# Patient Record
Sex: MALE | Race: OTHER | HISPANIC OR LATINO | Employment: FULL TIME | URBAN - METROPOLITAN AREA
[De-identification: names, ages, dates, MRNs, and addresses within clinical notes are randomized per-mention and may not be internally consistent; named-entity substitution may affect disease eponyms.]

---

## 2023-04-24 ENCOUNTER — OFFICE VISIT (OUTPATIENT)
Dept: OBGYN CLINIC | Facility: OTHER | Age: 48
End: 2023-04-24

## 2023-04-24 VITALS
DIASTOLIC BLOOD PRESSURE: 96 MMHG | HEIGHT: 70 IN | HEART RATE: 99 BPM | SYSTOLIC BLOOD PRESSURE: 149 MMHG | BODY MASS INDEX: 27.23 KG/M2 | WEIGHT: 190.2 LBS

## 2023-04-24 DIAGNOSIS — M54.16 RADICULOPATHY, LUMBAR REGION: Primary | ICD-10-CM

## 2023-04-24 DIAGNOSIS — M54.42 ACUTE BILATERAL LOW BACK PAIN WITH BILATERAL SCIATICA: ICD-10-CM

## 2023-04-24 DIAGNOSIS — M54.41 ACUTE BILATERAL LOW BACK PAIN WITH BILATERAL SCIATICA: ICD-10-CM

## 2023-04-24 RX ORDER — IBUPROFEN 600 MG/1
TABLET ORAL
COMMUNITY
Start: 2023-03-14

## 2023-04-24 RX ORDER — NAPROXEN 500 MG/1
TABLET ORAL
COMMUNITY
Start: 2023-03-23

## 2023-04-24 RX ORDER — METHOCARBAMOL 750 MG/1
TABLET, FILM COATED ORAL
COMMUNITY
Start: 2023-03-20

## 2023-04-24 RX ORDER — PANTOPRAZOLE SODIUM 40 MG/1
40 TABLET, DELAYED RELEASE ORAL DAILY
COMMUNITY
Start: 2023-04-07

## 2023-04-24 RX ORDER — FEXOFENADINE HCL 180 MG/1
180 TABLET ORAL DAILY
COMMUNITY
Start: 2023-03-20

## 2023-04-24 RX ORDER — OMEGA-3-ACID ETHYL ESTERS 1 G/1
CAPSULE, LIQUID FILLED ORAL
COMMUNITY
Start: 2023-04-07

## 2023-04-24 RX ORDER — ACETAMINOPHEN 500 MG
TABLET ORAL
COMMUNITY
Start: 2023-03-14

## 2023-04-24 RX ORDER — ALBUTEROL SULFATE 90 UG/1
2 AEROSOL, METERED RESPIRATORY (INHALATION) EVERY 4 HOURS PRN
COMMUNITY
Start: 2023-04-07

## 2023-04-24 RX ORDER — CETIRIZINE HYDROCHLORIDE 10 MG/1
10 TABLET ORAL DAILY
COMMUNITY
Start: 2023-04-07

## 2023-04-24 RX ORDER — LIDOCAINE 50 MG/G
PATCH TOPICAL
COMMUNITY
Start: 2023-03-14

## 2023-04-24 RX ORDER — CYCLOBENZAPRINE HCL 10 MG
10 TABLET ORAL 2 TIMES DAILY PRN
COMMUNITY
Start: 2023-04-07

## 2023-04-24 NOTE — LETTER
April 24, 2023     Patient: Kaylie Caal  YOB: 1975  Date of Visit: 4/24/2023      To Whom it May Concern:    Kaylie Caal is under my professional care  Bart Ruggiero was seen in my office on 4/24/2023  Bart Ruggiero may return to work with limitations   No repetitive bending or heavy lifting, pulling or pushing over 15 pounds  Follow-up after 4 weeks or sooner if any worse       If you have any questions or concerns, please don't hesitate to call           Sincerely,          Tracy Orourke MD        CC: No Recipients

## 2023-04-24 NOTE — PROGRESS NOTES
"Chief Complaint: Low back pain     HPI:    Marleny Muro is a 50y o  year old Male who presents today for new evaluation and treatment of low back pain       Description of symptoms: Per the patient, he reports having a back injury at work on 3/14/2023 and subsequently had another recurrence of pain at work on 3/17/2023  Patient reports that while lifting a pallet weighing 70 to 80 pounds on 3/14/2023 he started feeling acute low back pain  Pain was noticed in the entire lower back with radiation bilaterally to the posterior thigh up to the level of bilateral knees  He subsequently had worsening symptoms and was evaluated externally at [facility] on 3/14/2023  He was advised to take oral acetaminophen, NSAIDs, muscle relaxant and lidocaine patch  Patient has not done any physical therapy yet  Upon a subsequent visit to the emergency room on 3/17/2023 physical therapy and pain management referral was suggested  Patient subsequently followed up with his primary care physician  Patient denies having any significant relief of symptoms with pain medications so far and reports increased drowsiness with oral muscle relaxants  Patient denies having any previous back problems or surgery in the past prior to the reported work-related injury on 3/14/2023  Patient currently denies any new onset tingling, numbness or weakness of his lower extremities he does not have any known history of new onset bowel or bladder control problems  Denies any systemic symptoms like fever or chills  Plain radiograph of the lumbar spine was performed externally in the radiology report was reviewed as noted below  Images were currently unavailable for review  X-ray lumbar spine performed on 3/21/2023:  \"Findings: There is minimal levoscoliotic curvature of lumbar spine  The lumbar   lordosis is preserved  Vertebral body heights are maintained  There is minimal   retrolisthesis of L4 over L5   There is mild disc " "space narrowing at L4-L5  Mild   endplate spurring is noted at multiple levels  There is facet arthrosis at L4-L5   and L5-S1  Suggestion of pars interarticularis defects at L5  The visualized   ribs and sacroiliac joints are unremarkable  IMPRESSION:   Impression:   1  Degenerative changes at L4-L5  2  Cannot exclude pars interarticularis defects at L5\"    There is no problem list on file for this patient  Current Outpatient Medications on File Prior to Visit   Medication Sig Dispense Refill   • acetaminophen (TYLENOL) 500 mg tablet TAKE 1 TABLET (500 MG TOTAL) BY MOUTH EVERY 6 (SIX) HOURS AS NEEDED FOR MILD PAIN (PAIN SCORE 1-3)  • albuterol (PROVENTIL HFA,VENTOLIN HFA) 90 mcg/act inhaler Inhale 2 puffs every 4 (four) hours as needed     • albuterol (PROVENTIL HFA,VENTOLIN HFA) 90 mcg/act inhaler Inhale 2 puffs every 4 (four) hours as needed     • cetirizine (ZyrTEC) 10 mg tablet Take 10 mg by mouth daily     • fexofenadine (ALLEGRA) 180 MG tablet Take 180 mg by mouth daily     • ibuprofen (MOTRIN) 600 mg tablet TAKE 1 TABLET (600 MG TOTAL) BY MOUTH EVERY 6 (SIX) HOURS AS NEEDED FOR MILD PAIN (PAIN SCORE 1-3)  • lidocaine (LIDODERM) 5 % PLACE 1 PATCH ON THE SKIN DAILY FOR 7 DAYS  REMOVE & DISCARD PATCH WITHIN 12 HOURS OR AS DIRECTED BY MD     • omega-3-acid ethyl esters (LOVAZA) 1 g capsule TAKE 2 CAPSULES (2 G TOTAL) BY MOUTH 2 (TWO) TIMES A DAY  • pantoprazole (PROTONIX) 40 mg tablet Take 40 mg by mouth daily     • cyclobenzaprine (FLEXERIL) 10 mg tablet Take 10 mg by mouth 2 (two) times a day as needed (Patient not taking: Reported on 4/24/2023)     • methocarbamol (ROBAXIN) 750 mg tablet TAKE 1 TABLET (750 MG TOTAL) BY MOUTH 3 (THREE) TIMES A DAY AS NEEDED FOR MUSCLE SPASMS  (Patient not taking: Reported on 4/24/2023)     • naproxen (NAPROSYN) 500 mg tablet TAKE 1 TABLET (500 MG TOTAL) BY MOUTH 2 (TWO) TIMES A DAY WITH MEALS  TAKE WITH MEALS   (Patient not taking: Reported on 4/24/2023)  " No current facility-administered medications on file prior to visit  Not on File     Tobacco Use: Not on file        Social Determinants of Health     Tobacco Use: Not on file   Alcohol Use: Not on file   Financial Resource Strain: Not on file   Food Insecurity: Not on file   Transportation Needs: Not on file   Physical Activity: Not on file   Stress: Not on file   Social Connections: Not on file   Intimate Partner Violence: Not on file   Depression: Not on file   Housing Stability: Not on file               Review of Systems     Body mass index is 27 29 kg/m²  Physical Exam  Vitals and nursing note reviewed  HENT:      Head: Atraumatic  Cardiovascular:      Rate and Rhythm: Normal rate  Pulses: Normal pulses  Pulmonary:      Effort: Pulmonary effort is normal  No respiratory distress  Neurological:      Mental Status: He is alert and oriented to person, place, and time  Psychiatric:         Mood and Affect: Mood normal          Behavior: Behavior normal           Ortho Exam:    Body part: lumbar spine    Inspection: No clinically visible deformity    Palpation: Tender to palpation at the L4-L5 and the L5-S1 level    Range of motion: Limited lumbar spine flexion and extension  Special Tests: SLR subjectively positive bilaterally at 70 degrees  VERONICA induces low back discomfort bilaterally  Distal Neurovascular Status: Normal sensation to light touch in bilateral lower extremity all dermatomes and grade 5/5 strength of bilateral lower extremity all myotomes  2+ bilateral knee and ankle deep tendon reflexes and bilaterally downgoing Babinski's  Procedures       Assessment:     Diagnosis ICD-10-CM Associated Orders   1  Radiculopathy, lumbar region  M54 16 Nerve Stimulator (TENS Therapy Pain Relief) Alem Brown     Ambulatory Referral to Physical Therapy      2   Acute bilateral low back pain with bilateral sciatica  M54 42 Ambulatory Referral to Physical Therapy    M54 41 "    Plan:    Explained my current clinical findings and reviewed the radiology report with the patient  Symptoms are suggestive of bilateral lumbar radiculopathy with the radiology report also indicating L4-L5 degenerative disc disease and potential pars defect  He currently does not have any progressive neurological deficits on evaluation and hence I recommend a trial of physical therapy rehabilitation for which a referral is being made  Work modification note was also provided to the patient  Recommend him to use TENS therapy/e-stim for low back pain at this time and will follow-up in 4 weeks time for reassessment  If symptoms significantly persist, will consider an MRI of the lumbar spine for further evaluation  Follow-Up:    No follow-ups on file  Portions of the record may have been created with voice recognition software  Occasional wrong word or \"sound alike\" substitutions may have occurred due to the inherent limitations of voice recognition software  Please review the chart carefully and recognize, using context, where substitutions/typographical errors may have occurred           "

## 2023-04-25 ENCOUNTER — TELEPHONE (OUTPATIENT)
Dept: OBGYN CLINIC | Facility: HOSPITAL | Age: 48
End: 2023-04-25

## 2023-04-25 NOTE — TELEPHONE ENCOUNTER
Caller: Milla Irene @ [facility]    Doctor: Dolores Fleischer    Reason for call: Can we please fax Rx for PT? Thank you!     Call back#: [phone]    Fax#:  [phone]

## 2023-04-25 NOTE — TELEPHONE ENCOUNTER
Faxed PT script twice  Both attempts failed  Our office fax is currently down  Will try again once fax issue is resolved

## 2023-04-25 NOTE — TELEPHONE ENCOUNTER
Caller: Ariela Escalante with [facility]    Doctor: Higinio Burciaga    Reason for call: Please fax PT script to [phone] with patient's name in subject line       Call back#: n/a

## 2023-05-20 NOTE — PROGRESS NOTES
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)    Description of symptoms:     Last visit on 04/24/2023: Patient was evaluated for low back pain after an injury lifting pallets at work  Initially was seen by [facility] on 03/14/2023 with tx plan consisted of oral tylenol, oral NSAIDs, muscle relaxants  He was seen again on 03/17/2023 in [facility] ED for low back pain and was given formal physical therapy script and pain management referral  Elisa Kyaw were reviewed and additional management consisted of TENS therapy and work modifications  If no improvement in pain at next visit would consider additional imaging  Today's Visit: Patient continues to experience pain in his lower back despite several conservative management options  There are some radiation of the pain to the gluteal area primarily on the left side  Denies any new weakness  Denies any new bowel or bladder control problems  Denies new injury  Summary of treatment to-date:   Oral tylenol  Oral NSAIDs  Muscle relaxants  Formal physical therapy  Referral pain management  TENS therapy    I have personally reviewed pertinent films in PACS  X-ray lumbar spine performed on 3/21/2023:  \"Findings: There is minimal levoscoliotic curvature of lumbar spine  The lumbar   lordosis is preserved  Vertebral body heights are maintained  There is minimal   retrolisthesis of L4 over L5  There is mild disc space narrowing at L4-L5  Mild   endplate spurring is noted at multiple levels  There is facet arthrosis at L4-L5   and L5-S1  Suggestion of pars interarticularis defects at L5  The visualized   ribs and sacroiliac joints are unremarkable  IMPRESSION:   Impression:   1  Degenerative changes at L4-L5     2  Cannot exclude pars interarticularis defects at " "L5\"    There is no problem list on file for this patient  Current Outpatient Medications on File Prior to Visit   Medication Sig Dispense Refill   • acetaminophen (TYLENOL) 500 mg tablet TAKE 1 TABLET (500 MG TOTAL) BY MOUTH EVERY 6 (SIX) HOURS AS NEEDED FOR MILD PAIN (PAIN SCORE 1-3)  • albuterol (PROVENTIL HFA,VENTOLIN HFA) 90 mcg/act inhaler Inhale 2 puffs every 4 (four) hours as needed     • albuterol (PROVENTIL HFA,VENTOLIN HFA) 90 mcg/act inhaler Inhale 2 puffs every 4 (four) hours as needed     • cetirizine (ZyrTEC) 10 mg tablet Take 10 mg by mouth daily     • cyclobenzaprine (FLEXERIL) 10 mg tablet Take 10 mg by mouth 2 (two) times a day as needed (Patient not taking: Reported on 4/24/2023)     • fexofenadine (ALLEGRA) 180 MG tablet Take 180 mg by mouth daily     • ibuprofen (MOTRIN) 600 mg tablet TAKE 1 TABLET (600 MG TOTAL) BY MOUTH EVERY 6 (SIX) HOURS AS NEEDED FOR MILD PAIN (PAIN SCORE 1-3)  • lidocaine (LIDODERM) 5 % PLACE 1 PATCH ON THE SKIN DAILY FOR 7 DAYS  REMOVE & DISCARD PATCH WITHIN 12 HOURS OR AS DIRECTED BY MD     • methocarbamol (ROBAXIN) 750 mg tablet TAKE 1 TABLET (750 MG TOTAL) BY MOUTH 3 (THREE) TIMES A DAY AS NEEDED FOR MUSCLE SPASMS  (Patient not taking: Reported on 4/24/2023)     • naproxen (NAPROSYN) 500 mg tablet TAKE 1 TABLET (500 MG TOTAL) BY MOUTH 2 (TWO) TIMES A DAY WITH MEALS  TAKE WITH MEALS  (Patient not taking: Reported on 4/24/2023)     • Nerve Stimulator (TENS Therapy Pain Relief) REID Use 1 application  2 (two) times a day as needed (for lumbar radiculopathy) 1 each 0   • omega-3-acid ethyl esters (LOVAZA) 1 g capsule TAKE 2 CAPSULES (2 G TOTAL) BY MOUTH 2 (TWO) TIMES A DAY  • pantoprazole (PROTONIX) 40 mg tablet Take 40 mg by mouth daily       No current facility-administered medications on file prior to visit          No Known Allergies     Tobacco Use: High Risk   • Smoking Tobacco Use: Every Day   • Smokeless Tobacco Use: Never   • Passive Exposure: Not " on file        Social Determinants of Health     Tobacco Use: High Risk   • Smoking Tobacco Use: Every Day   • Smokeless Tobacco Use: Never   • Passive Exposure: Not on file   Alcohol Use: Not on file   Financial Resource Strain: Not on file   Food Insecurity: Not on file   Transportation Needs: Not on file   Physical Activity: Not on file   Stress: Not on file   Social Connections: Not on file   Intimate Partner Violence: Not on file   Depression: Not on file   Housing Stability: Not on file               Review of Systems     There is no height or weight on file to calculate BMI  Physical Exam  Vitals and nursing note reviewed  HENT:      Head: Atraumatic  Eyes:      Conjunctiva/sclera: Conjunctivae normal    Cardiovascular:      Rate and Rhythm: Normal rate  Pulses: Normal pulses  Pulmonary:      Effort: Pulmonary effort is normal  No respiratory distress  Neurological:      Mental Status: He is alert and oriented to person, place, and time  Psychiatric:         Mood and Affect: Mood normal          Behavior: Behavior normal           Ortho Exam:    Body part: Lumbar fine    Inspection: Clinically, bilaterally symmetrical iliac crest    Palpation: (+) tenderness: Significantly tender to palpation particularly of the L4-L5 level as well as bilateral lower lumbar paraspinal area left worse than right  Range of motion: Limited lumbar spine flexion and extension  Special Tests: Negative SLR on the right and equivocal SLR on the left at 70 degrees  VERONICA does not induce any significant back pain on today's office visit  Distal Neurovascular Status: Intact, Yes     Procedures       Assessment:     Diagnosis ICD-10-CM Associated Orders   1  Radiculopathy, lumbar region  M54 16 MRI lumbar spine wo contrast     LORazepam (Ativan) 0 5 mg tablet      2  Lumbar pars defect  M43 06 MRI lumbar spine wo contrast     LORazepam (Ativan) 0 5 mg tablet      3   Facet arthritis of lumbar region  M47 810 "MRI lumbar spine wo contrast     LORazepam (Ativan) 0 5 mg tablet      4  Claustrophobia  F40 240 LORazepam (Ativan) 0 5 mg tablet           Plan:    Explained my current clinical findings to the patient today  Unfortunately, he continues to experience significant low back pain despite conservative management for over 6 weeks  Hence, I will request a lumbar spine MRI for further evaluation in this regard to exclude significant L4-L5 disc disease and root compression as well as to exclude any potential L5 pars defect/injury  May continue to use TENS machine and with physical therapy in the interim  Patient was prescribed preprocedural lorazepam, 2 tablets since he reports claustrophobia  PDMP was checked and found to be appropriate  Shared decision making, patient agreeable to plan  Follow-Up:    Return for Follow-up after lumbar spine MRI  Shaun Mixer Portions of the record may have been created with voice recognition software  Occasional wrong word or \"sound alike\" substitutions may have occurred due to the inherent limitations of voice recognition software  Please review the chart carefully and recognize, using context, where substitutions/typographical errors may have occurred           "

## 2023-05-22 ENCOUNTER — OFFICE VISIT (OUTPATIENT)
Dept: OBGYN CLINIC | Facility: OTHER | Age: 48
End: 2023-05-22

## 2023-05-22 VITALS
BODY MASS INDEX: 27.06 KG/M2 | WEIGHT: 189 LBS | SYSTOLIC BLOOD PRESSURE: 126 MMHG | HEIGHT: 70 IN | HEART RATE: 92 BPM | DIASTOLIC BLOOD PRESSURE: 82 MMHG

## 2023-05-22 DIAGNOSIS — F40.240 CLAUSTROPHOBIA: ICD-10-CM

## 2023-05-22 DIAGNOSIS — M54.16 RADICULOPATHY, LUMBAR REGION: Primary | ICD-10-CM

## 2023-05-22 DIAGNOSIS — M43.06 LUMBAR PARS DEFECT: ICD-10-CM

## 2023-05-22 DIAGNOSIS — M47.816 FACET ARTHRITIS OF LUMBAR REGION: ICD-10-CM

## 2023-05-22 RX ORDER — LORAZEPAM 0.5 MG/1
TABLET ORAL
Qty: 2 TABLET | Refills: 0 | Status: SHIPPED | OUTPATIENT
Start: 2023-05-22 | End: 2023-05-22

## 2023-05-22 RX ORDER — LORAZEPAM 0.5 MG/1
TABLET ORAL
Qty: 2 TABLET | Refills: 0 | Status: SHIPPED | OUTPATIENT
Start: 2023-05-22

## 2023-05-22 NOTE — LETTER
May 22, 2023     Patient: Geneva Zamorano  YOB: 1975  Date of Visit: 5/22/2023      To Whom it May Concern:    Geneva Zamorano is under my professional care  Mark Vanegas was seen in my office on 5/22/2023  Mark Vanegas may return to work with limitations   Heavy lifting, pulling or pushing over 15 pounds and avoid repetitive bending or twisting type activities  These restrictions are in place until office follow-up after requested investigation is completed       If you have any questions or concerns, please don't hesitate to call           Sincerely,          Luis Eduardo Joy MD        CC: No Recipients

## 2023-05-31 ENCOUNTER — TELEPHONE (OUTPATIENT)
Dept: OBGYN CLINIC | Facility: HOSPITAL | Age: 48
End: 2023-05-31

## 2023-05-31 NOTE — TELEPHONE ENCOUNTER
Caller: Dena Davies  Doctor: Chantel Quintero    Reason for call: electronically faxed 5/22 OVN and work status to fax # [phone]

## 2023-06-27 ENCOUNTER — OFFICE VISIT (OUTPATIENT)
Dept: OBGYN CLINIC | Facility: OTHER | Age: 48
End: 2023-06-27
Payer: OTHER MISCELLANEOUS

## 2023-06-27 VITALS
WEIGHT: 187 LBS | SYSTOLIC BLOOD PRESSURE: 142 MMHG | HEIGHT: 70 IN | DIASTOLIC BLOOD PRESSURE: 88 MMHG | BODY MASS INDEX: 26.77 KG/M2 | HEART RATE: 85 BPM

## 2023-06-27 DIAGNOSIS — M47.816 FACET ARTHRITIS OF LUMBAR REGION: ICD-10-CM

## 2023-06-27 DIAGNOSIS — M54.16 RADICULOPATHY, LUMBAR REGION: Primary | ICD-10-CM

## 2023-06-27 DIAGNOSIS — M51.36 DEGENERATIVE DISC DISEASE, LUMBAR: ICD-10-CM

## 2023-06-27 PROCEDURE — 99203 OFFICE O/P NEW LOW 30 MIN: CPT | Performed by: ORTHOPAEDIC SURGERY

## 2023-06-27 NOTE — PROGRESS NOTES
"Chief Complaint: Back pain    HPI:    Alexey Patton is a 50year old Male who presents today for follow up of back pain  Injury related: Yes, Workmen's Compensation case  DOI: 3/14/2023, 3/17/2023 while lifting pallets    Description of symptoms: Last visit in this regard 2023  He presents to clinic today with his Workmen's Compensation liaison  At his last visit, given lack of resolution of symptoms following a course of formal physical therapy, patient was requested to obtain MRI evaluation of his lumbar spine  MRI of the lumbar spine obtained at an outside institution on 2023 carries the impression: \"Multilevel disc degeneration  Most prominent central and neural foraminal encroachment is at L4-L5  \"  Today, patient reports continued low back discomfort rated at a 3-4/10 localized to his lumbar spine on the left with radiation to the left buttock  States pain is worse when bending down/to the left as well as when twisting left or right to pick in place boxes at work  He has been taking Tylenol with little avail  He states muscle relaxers prescribed at previous visit made him \"too sleepy\"  He denies any new injuries since time of last visit  He denies any distal/lower extremity numbness, tingling, or weakness  He states he is still attending formal physical therapy twice weekly and is doing his home exercises on a daily basis  He states his employer has  the light duty restrictions that he was provided at his last visit and that he is now back to normal/full duty lifting 30 to 40 pounds at a time  Summary of treatment to-date: As outlined above and in previous notes  I have personally reviewed pertinent films in PACS and my interpretation is As outlined in HPI above  There is no problem list on file for this patient         Current Outpatient Medications on File Prior to Visit   Medication Sig Dispense Refill   • acetaminophen (TYLENOL) 500 mg tablet TAKE 1 TABLET (500 MG " TOTAL) BY MOUTH EVERY 6 (SIX) HOURS AS NEEDED FOR MILD PAIN (PAIN SCORE 1-3)  • albuterol (PROVENTIL HFA,VENTOLIN HFA) 90 mcg/act inhaler Inhale 2 puffs every 4 (four) hours as needed     • albuterol (PROVENTIL HFA,VENTOLIN HFA) 90 mcg/act inhaler Inhale 2 puffs every 4 (four) hours as needed     • cetirizine (ZyrTEC) 10 mg tablet Take 10 mg by mouth daily     • cyclobenzaprine (FLEXERIL) 10 mg tablet Take 10 mg by mouth 2 (two) times a day as needed (Patient not taking: Reported on 4/24/2023)     • fexofenadine (ALLEGRA) 180 MG tablet Take 180 mg by mouth daily     • ibuprofen (MOTRIN) 600 mg tablet TAKE 1 TABLET (600 MG TOTAL) BY MOUTH EVERY 6 (SIX) HOURS AS NEEDED FOR MILD PAIN (PAIN SCORE 1-3)  • lidocaine (LIDODERM) 5 % PLACE 1 PATCH ON THE SKIN DAILY FOR 7 DAYS  REMOVE & DISCARD PATCH WITHIN 12 HOURS OR AS DIRECTED BY MD     • LORazepam (Ativan) 0 5 mg tablet Take 1 tablet by mouth 60 minutes prior to the scheduled procedure  If still feeling anxious may repeat 1 tablet orally 30 minutes before the procedure  Do not take more than 2 tablets  Do not drive, drink alcohol or operate heavy machinery after taking the medication  2 tablet 0   • methocarbamol (ROBAXIN) 750 mg tablet TAKE 1 TABLET (750 MG TOTAL) BY MOUTH 3 (THREE) TIMES A DAY AS NEEDED FOR MUSCLE SPASMS  (Patient not taking: Reported on 4/24/2023)     • naproxen (NAPROSYN) 500 mg tablet TAKE 1 TABLET (500 MG TOTAL) BY MOUTH 2 (TWO) TIMES A DAY WITH MEALS  TAKE WITH MEALS  (Patient not taking: Reported on 4/24/2023)     • Nerve Stimulator (TENS Therapy Pain Relief) REID Use 1 application  2 (two) times a day as needed (for lumbar radiculopathy) 1 each 0   • omega-3-acid ethyl esters (LOVAZA) 1 g capsule TAKE 2 CAPSULES (2 G TOTAL) BY MOUTH 2 (TWO) TIMES A DAY  • pantoprazole (PROTONIX) 40 mg tablet Take 40 mg by mouth daily       No current facility-administered medications on file prior to visit          No Known Allergies     Tobacco Use: High Risk (6/27/2023)    Patient History    • Smoking Tobacco Use: Every Day    • Smokeless Tobacco Use: Never    • Passive Exposure: Not on file        Social Determinants of Health     Tobacco Use: High Risk (6/27/2023)    Patient History    • Smoking Tobacco Use: Every Day    • Smokeless Tobacco Use: Never    • Passive Exposure: Not on file   Alcohol Use: Not on file   Financial Resource Strain: Not on file   Food Insecurity: Not on file   Transportation Needs: Not on file   Physical Activity: Not on file   Stress: Not on file   Social Connections: Not on file   Intimate Partner Violence: Not on file   Depression: Not on file   Housing Stability: Not on file      Review of Systems     Body mass index is 26 83 kg/m²  Physical Exam  Vitals and nursing note reviewed  Constitutional:       General: He is not in acute distress  Appearance: Normal appearance  He is normal weight  He is not ill-appearing, toxic-appearing or diaphoretic  HENT:      Head: Normocephalic and atraumatic  Eyes:      General: No scleral icterus  Conjunctiva/sclera: Conjunctivae normal    Cardiovascular:      Pulses: Normal pulses  Pulmonary:      Effort: Pulmonary effort is normal  No respiratory distress  Skin:     General: Skin is warm and dry  Capillary Refill: Capillary refill takes less than 2 seconds  Neurological:      Mental Status: He is alert and oriented to person, place, and time            Ortho Exam:  BACK EXAM:  Gait: normal, no trendelenberg gait, no antalgic gait    BACK TENDERNESS:  Spinous Processes: no  Paraspinal Muscles: + Left  SI Joint: no  Sacrum: no    ROM:  Flexion: intact  Extension: intact  Sidebending: intact    DERMATOMAL SENSATION:  L1: normal   L2: normal   L3: normal   L4: normal   L5: normal   S1: normal    STRENGTH (bilateral):  Knee Extension: 5/5  Knee Flexion: 5/5  Foot Dorsiflexion: 5/5  Great Toe Extension: 5/5  Foot Plantarflexion: 5/5  Hip Flexion: 5/5  Hip Abduction: 5/5    REFLEXES:  Patellar: 2+ bilateral    BACK:   SUPINE STRAIGHT LEG: negative bilaterally    RIGHT HIP:  LOG ROLL: negative  VERONICA: negative  FADIR: negative    LEFT HIP:  LOG ROLL: negative  VERONICA: negative  FADIR: negative    SI JOINT:  ASIS COMPRESSION TEST: Negative      Procedures       Assessment:     Diagnosis ICD-10-CM Associated Orders   1  Radiculopathy, lumbar region  M54 [id number] Ambulatory referral to Pain Management      2  Degenerative disc disease, lumbar  M51 [id number] Ambulatory referral to Pain Management      3  Facet arthritis of lumbar region  [id number] Ambulatory referral to Pain Management           Plan: We reviewed our current clinical assessment with Matt Licea and his accompanying Workmen's Compensation liaison  We reviewed his imaging, which includes lumbar spine MRI and reveals mild to moderate central canal and moderate bilateral foraminal stenosis with no acute osseous abnormalities  We discussed management options, reviewed natural history, and engaged in shared decision-making  We extensively reviewed that his advanced imaging shows no sign of traumatic injury to the lumbar spine and that his current presentation is likely secondary to chronic osteoarthritic changes in the spine that were exacerbated by activity  We did review with him that, given the chronic nature of arthritis, he is likely to experience recurrence of symptoms in future  Given his continued discomfort despite conservative measures since establishing with our practice, we will refer him at this time to our colleagues in pain management for discussion regarding interventional options    He was provided a new work note which lifted some of his previous work restrictions with the following recommendations: He is to lift only up to 40 pounds at a time (he is to increase his weightbearing gradually) and is recommended to avoid repetitive bending activities; he was advised to return to normal work duty activities within his "level of comfort; it was indicated on his note that these are likely to be long-term recommendations given the long-term prognosis of osteoarthritis  We did mention to the patient and his accompanying liaison that we can consider functional capacity evaluation through physical therapy in future if needed, however the patient and his liaison deferred this referral at this time in order to avoid permanent work restrictions and limitations  Additionally, the patient inquired about FMLA paperwork in the case that he needs to take time off intermittently from work in future if needed for pain - we did mention to him that it would be more appropriate to discuss this option with either his primary care physician (who knows the patient well and will continue to follow the patient in the long-term) or with his Workmen's Compensation team  Patient endorsed understanding and acceptance of the above plan  Follow-Up:    With pain management        Portions of the record may have been created with voice recognition software  Occasional wrong word or \"sound alike\" substitutions may have occurred due to the inherent limitations of voice recognition software  Please review the chart carefully and recognize, using context, where substitutions/typographical errors may have occurred           "

## 2023-06-27 NOTE — LETTER
June 27, 2023     Patient: Dewayne Baez  YOB: 1975  Date of Visit: 6/27/2023      To Whom it May Concern:    Dewayne Baez is under my professional care  Elroy Wallis was seen in my office on 6/27/2023  Elroy Wallis may return to work with limitations   Elroy Wallis may perform work duties with the following recommendations  Avoid heavy lifting, pulling or pushing over 40 pounds and avoid repetitive bending activities  May perform work duty within limits of comfort  These suggestions are long-term based on patient's underlying musculoskeletal pathology  If you have any questions or concerns, please don't hesitate to call           Sincerely,          Sridhar Malagon MD        CC: No Recipients

## 2023-06-28 ENCOUNTER — TELEPHONE (OUTPATIENT)
Dept: OTHER | Facility: OTHER | Age: 48
End: 2023-06-28

## 2023-06-28 NOTE — TELEPHONE ENCOUNTER
Addie Yan from Workers [employer]  for patient is calling to schedule a new  appointment for patient    Please f/u

## 2023-07-24 ENCOUNTER — CONSULT (OUTPATIENT)
Dept: PAIN MEDICINE | Facility: CLINIC | Age: 48
End: 2023-07-24
Payer: OTHER MISCELLANEOUS

## 2023-07-24 VITALS
HEIGHT: 70 IN | SYSTOLIC BLOOD PRESSURE: 140 MMHG | BODY MASS INDEX: 26.77 KG/M2 | WEIGHT: 187 LBS | DIASTOLIC BLOOD PRESSURE: 80 MMHG

## 2023-07-24 DIAGNOSIS — M47.816 LUMBAR SPONDYLOSIS: ICD-10-CM

## 2023-07-24 DIAGNOSIS — M54.16 LUMBAR RADICULAR PAIN: Primary | ICD-10-CM

## 2023-07-24 PROCEDURE — 99204 OFFICE O/P NEW MOD 45 MIN: CPT | Performed by: ANESTHESIOLOGY

## 2023-07-24 NOTE — PROGRESS NOTES
Assessment  1. Lumbar radicular pain    2. Lumbar spondylosis        Plan  Patient presenting with acute on chronic back pain for 4+ months, despite ongoing conservative therapies. Pain is consistent with lumbar radicular pain and lumbar spondylosis in  accompanied by pain >7/10 on the pain scale with inability to participate in IADLs for >6 weeks. Patient has fully participated with physical therapy as well as home exercises and stretches. Has been taking NSAIDs, Tylenol, Lidocaine patches with modest benefit. Denies any gait instability, saddle anesthesia. In regards to the patient's lumbar pathology, we discussed the various treatment options including physical therapy, chiropractic treatment, medication management, activity modifications, interventional spine procedures. Given that patient has not had any benefit with conservative treatments, I think patient would benefit from targeted interventional treatment modalities. Reviewed and interpreted relevant imaging studies - specifically lumbar MRI and discussed the results and clinical significance with the patient. - Would offer the patient a L4-5 LESI given spinal stenosis and history of back and occasional leg pain. Risks, benefits, and alternatives to epidural steroid injections thoroughly discussed with patient. Complete risks and benefits including bleeding, infection, tissue reaction, nerve injury and allergic reaction were discussed. The approach was demonstrated using models and literature was provided. Verbal consent was obtained. - Reviewed external notes from the relevant aspects of the patient's medical record, specifically Orthopedic sports medicine, primary care physician notes in regards to current and prior treatments tried (as mentioned in history of present illness).     - Recommend trial of OTC Voltaren gel    Reviewed pertinent laboratory studies, specifically renal function, hemoglobin A1c, CBC, coagulation studies, prior to recommending medication therapies/interventional treatment options. [place] Prescription Drug Monitoring Program report was reviewed and was appropriate     My impressions and treatment recommendations were discussed in detail with the patient who verbalized understanding and had no further questions. Discharge instructions were provided. I personally saw and examined the patient and I agree with the above discussed plan of care. Orders Placed This Encounter   Procedures   • [place] spine and pain procedure     Standing Status:   Future     Standing Expiration Date:   7/24/2027     Order Specific Question:   Reason for Exam:     Answer:   L4-5 LESI     Order Specific Question:   Anticoagulant hold needed? Answer:   no     No orders of the defined types were placed in this encounter. History of Present Illness    Tigre Castro is a 50 y.o. male presenting for consultation at [facility] for exam and evaluation of chronic worsening low back pain for 4+ months and ongoing pain started years ago, worsening after work-related injury on 3/14/2023 and 3/17/2023. Over the past month, the intensity of pain has been Moderate to severe. Pain is currently 4/10. Pain does interfere with age appropriate activities of daily living. Pain is nearly constant, with no typical pattern throughout the day. Pain is described as sharp, stabbing. Patient denies weakness in the lower extremities. Assistance device used: None. Pain is increased with standing, sitting. Pain is decreased with exercise, walking. Prior pertinent treatments tried: Physical therapy, TENS unit. Pertinent medications tried/currently taking: Tylenol, naproxen, cyclobenzaprine, methocarbamol, lidocaine patches. I have personally reviewed and/or updated the patient's past medical history, past surgical history, family history, social history, current medications, allergies, and vital signs today.      Review of Systems Constitutional: Negative for chills and fever. HENT: Negative for ear pain and sore throat. Eyes: Negative for pain and visual disturbance. Respiratory: Negative for cough and shortness of breath. Cardiovascular: Negative for chest pain and palpitations. Gastrointestinal: Negative for abdominal pain and vomiting. Genitourinary: Negative for dysuria and hematuria. Musculoskeletal: Positive for back pain. Negative for arthralgias. Skin: Negative for color change and rash. Neurological: Positive for numbness. Negative for seizures and syncope. All other systems reviewed and are negative. There is no problem list on file for this patient. Past Medical History:   Diagnosis Date   • Asthma        History reviewed. No pertinent surgical history. History reviewed. No pertinent family history. Social History     Occupational History   • Not on file   Tobacco Use   • Smoking status: Every Day     Types: Cigarettes   • Smokeless tobacco: Never   Vaping Use   • Vaping Use: Never used   Substance and Sexual Activity   • Alcohol use: Never   • Drug use: Never   • Sexual activity: Not on file       Current Outpatient Medications on File Prior to Visit   Medication Sig   • acetaminophen (TYLENOL) 500 mg tablet TAKE 1 TABLET (500 MG TOTAL) BY MOUTH EVERY 6 (SIX) HOURS AS NEEDED FOR MILD PAIN (PAIN SCORE 1-3). • albuterol (PROVENTIL HFA,VENTOLIN HFA) 90 mcg/act inhaler Inhale 2 puffs every 4 (four) hours as needed   • albuterol (PROVENTIL HFA,VENTOLIN HFA) 90 mcg/act inhaler Inhale 2 puffs every 4 (four) hours as needed   • cetirizine (ZyrTEC) 10 mg tablet Take 10 mg by mouth daily   • fexofenadine (ALLEGRA) 180 MG tablet Take 180 mg by mouth daily   • ibuprofen (MOTRIN) 600 mg tablet TAKE 1 TABLET (600 MG TOTAL) BY MOUTH EVERY 6 (SIX) HOURS AS NEEDED FOR MILD PAIN (PAIN SCORE 1-3). • lidocaine (LIDODERM) 5 % PLACE 1 PATCH ON THE SKIN DAILY FOR 7 DAYS.  REMOVE & DISCARD PATCH WITHIN 12 HOURS OR AS DIRECTED BY MD   • LORazepam (Ativan) 0.5 mg tablet Take 1 tablet by mouth 60 minutes prior to the scheduled procedure. If still feeling anxious may repeat 1 tablet orally 30 minutes before the procedure. Do not take more than 2 tablets. Do not drive, drink alcohol or operate heavy machinery after taking the medication. • Nerve Stimulator (TENS Therapy Pain Relief) REID Use 1 application. 2 (two) times a day as needed (for lumbar radiculopathy)   • omega-3-acid ethyl esters (LOVAZA) 1 g capsule TAKE 2 CAPSULES (2 G TOTAL) BY MOUTH 2 (TWO) TIMES A DAY. • pantoprazole (PROTONIX) 40 mg tablet Take 40 mg by mouth daily   • cyclobenzaprine (FLEXERIL) 10 mg tablet Take 10 mg by mouth 2 (two) times a day as needed (Patient not taking: Reported on 4/24/2023)   • methocarbamol (ROBAXIN) 750 mg tablet TAKE 1 TABLET (750 MG TOTAL) BY MOUTH 3 (THREE) TIMES A DAY AS NEEDED FOR MUSCLE SPASMS. (Patient not taking: Reported on 4/24/2023)   • naproxen (NAPROSYN) 500 mg tablet TAKE 1 TABLET (500 MG TOTAL) BY MOUTH 2 (TWO) TIMES A DAY WITH MEALS. TAKE WITH MEALS. (Patient not taking: Reported on 4/24/2023)     No current facility-administered medications on file prior to visit. No Known Allergies    Physical Exam    /80   Ht 5' 10" (1.778 m)   Wt 84.8 kg (187 lb)   BMI 26.83 kg/m²     Constitutional: normal, well developed, well nourished, alert, in no distress and non-toxic and no overt pain behavior. Eyes: anicteric  HEENT: grossly intact  Neck: supple, symmetric, trachea midline and no masses   Pulmonary:even and unlabored  Cardiovascular:No edema or pitting edema present  Skin:Normal without rashes or lesions and well hydrated  Psychiatric:Mood and affect appropriate  Neurologic: Motor function is grossly intact with no focal neuro deficits  Musculoskeletal: Limited lumbar spine ROM with extension and lateral flexion. Tenderness in the lumbar paraspinals.      Imaging  MRI lumbar spine 6/9/23:  Multilevel degnerative disc and facet changes. Mild to moderate stenosis at L4-5. Moderate bilateral foraminal narrowing at L4-5.

## 2023-08-17 ENCOUNTER — HOSPITAL ENCOUNTER (OUTPATIENT)
Dept: RADIOLOGY | Facility: MEDICAL CENTER | Age: 48
Discharge: HOME | End: 2023-08-17
Payer: OTHER MISCELLANEOUS

## 2023-08-17 VITALS
SYSTOLIC BLOOD PRESSURE: 103 MMHG | OXYGEN SATURATION: 98 % | RESPIRATION RATE: 16 BRPM | DIASTOLIC BLOOD PRESSURE: 74 MMHG | TEMPERATURE: 98.2 F | HEART RATE: 78 BPM

## 2023-08-17 DIAGNOSIS — M54.16 LUMBAR RADICULAR PAIN: ICD-10-CM

## 2023-08-17 PROCEDURE — 62323 NJX INTERLAMINAR LMBR/SAC: CPT | Performed by: ANESTHESIOLOGY

## 2023-08-17 RX ORDER — METHYLPREDNISOLONE ACETATE 80 MG/ML
80 INJECTION, SUSPENSION INTRA-ARTICULAR; INTRALESIONAL; INTRAMUSCULAR; PARENTERAL; SOFT TISSUE ONCE
Status: COMPLETED | OUTPATIENT
Start: 2023-08-17 | End: 2023-08-17

## 2023-08-17 RX ORDER — BUPIVACAINE HCL/PF 2.5 MG/ML
1 VIAL (ML) INJECTION ONCE
Status: COMPLETED | OUTPATIENT
Start: 2023-08-17 | End: 2023-08-17

## 2023-08-17 RX ORDER — ASPIRIN 81 MG/1
81 TABLET, CHEWABLE ORAL AS NEEDED
COMMUNITY

## 2023-08-17 RX ADMIN — IOHEXOL 1 ML: 300 INJECTION, SOLUTION INTRAVENOUS at 09:07

## 2023-08-17 RX ADMIN — METHYLPREDNISOLONE ACETATE 80 MG: 80 INJECTION, SUSPENSION INTRA-ARTICULAR; INTRALESIONAL; INTRAMUSCULAR; PARENTERAL; SOFT TISSUE at 09:07

## 2023-08-17 RX ADMIN — Medication 1 ML: at 09:07

## 2023-08-17 NOTE — DISCHARGE INSTRUCTIONS
Epidural Steroid Injection   WHAT YOU NEED TO KNOW:   An epidural steroid injection (CRISTINA) is a procedure to inject steroid medicine into the epidural space. The epidural space is between your spinal cord and vertebrae. Steroids reduce inflammation and fluid buildup in your spine that may be causing pain. You may be given pain medicine along with the steroids. ACTIVITY  Do not drive or operate machinery today. No strenuous activity today - bending, lifting, etc.  You may resume normal activites starting tomorrow - start slowly and as tolerated. You may shower today, but no tub baths or hot tubs. You may have numbness for several hours from the local anesthetic. Please use caution and common sense, especially with weight-bearing activities. CARE OF THE INJECTION SITE  If you have soreness or pain, apply ice to the area today (20 minutes on/20 minutes off). Starting tomorrow, you may use warm, moist heat or ice if needed. You may have an increase or change in your discomfort for 36-48 hours after your treatment. Apply ice and continue with any pain medication you have been prescribed. Notify [facility] if you have any of the following: redness, drainage, swelling, headache, stiff neck or fever above 100°F.    SPECIAL INSTRUCTIONS  Our office will contact you in approximately 7 days for a progress report. MEDICATIONS  Continue to take all routine medications. Our office may have instructed you to hold some medications. As no general anesthesia was used in today's procedure, you should not experience any side effects related to anesthesia. If you are diabetic, the steroids used in today's injection may temporarily increase your blood sugar levels after the first few days after your injection. Please keep a close eye on your sugars and alert the doctor who manages your diabetes if your sugars are significantly high from your baseline or you are symptomatic.      If you have a problem specifically related to your procedure, please call our office at [phone]. Problems not related to your procedure should be directed to your primary care physician. INSTRUCCIONES PARA EL ANA DE BHUPINDER INYECCIÓN EPIDURAL DE ESTEROIDES    ACTIVIDAD   No conduzca ni opere maquinarias por kieran. No realice actividades extenuantes por willyy, petey agacharse, levantar objetos, etc.   Puede retomar stacy actividades normales desde mañana. Comience progresivamente y en la medida que lo tolere. Puede ducharse, ry no se bañe en tinas ni en jacuzzis por hoy. Puede sentir entumecimiento tanna varias horas debido a la anestesia local. Sea precavido y use el sentido común, en especial con las actividades que implican la carga de [place]. CUIDADO DEL ÁREA DE APLICACIÓN DE LA INYECCIÓN   Si siente molestias o dolor, aplique hielo en el área por kieran (colóquelo tanna 20 minutos y retírelo tanna otros 20 minutos). A partir de mañana, podrá aplicar calor húmedo o hielo, si lo necesita. Puede experimentar un aumento o cambio en el malestar tanna las 36-48 horas posteriores al tratamiento. Aplique hielo y continúe tomando cualquier analgésico que le hayan recetado. Informe a [facility] si se presenta alguno de estos síntomas: enrojecimiento, secreción, inflamación, dolor de adriana, rigidez de joanie o fiebre superior a 100 °F. INSTRUCCIONES ESPECIALES  Se pondrán en contacto de nuestro consultorio con usted en aproximadamente 7 días para pedir un informe de progreso. MEDICAMENTOS   Continúe tomando todos stacy medicamentos de [place]. Es posible que en nuestro consultorio le hayan indicado que deje de sumit algunos medicamentos. Puede volver a tomarlos el:              Si tiene algún problema relacionado específicamente con el procedimiento, llame a nuestro consultorio al [phone].  Si tiene problemas que no están relacionados con nielsen procedimiento, debe comunicarse con nielsen médico dillon reese.

## 2023-08-17 NOTE — H&P
History of Present Illness: The patient is a 50 y.o. male who presents with complaints of back and leg pain    Past Medical History:   Diagnosis Date   • Asthma        No past surgical history on file. Current Outpatient Medications:   •  aspirin 81 mg chewable tablet, Chew 81 mg if needed for mild pain, Disp: , Rfl:   •  acetaminophen (TYLENOL) 500 mg tablet, TAKE 1 TABLET (500 MG TOTAL) BY MOUTH EVERY 6 (SIX) HOURS AS NEEDED FOR MILD PAIN (PAIN SCORE 1-3). , Disp: , Rfl:   •  albuterol (PROVENTIL HFA,VENTOLIN HFA) 90 mcg/act inhaler, Inhale 2 puffs every 4 (four) hours as needed, Disp: , Rfl:   •  albuterol (PROVENTIL HFA,VENTOLIN HFA) 90 mcg/act inhaler, Inhale 2 puffs every 4 (four) hours as needed, Disp: , Rfl:   •  cetirizine (ZyrTEC) 10 mg tablet, Take 10 mg by mouth daily, Disp: , Rfl:   •  cyclobenzaprine (FLEXERIL) 10 mg tablet, Take 10 mg by mouth 2 (two) times a day as needed (Patient not taking: Reported on 4/24/2023), Disp: , Rfl:   •  fexofenadine (ALLEGRA) 180 MG tablet, Take 180 mg by mouth daily, Disp: , Rfl:   •  ibuprofen (MOTRIN) 600 mg tablet, TAKE 1 TABLET (600 MG TOTAL) BY MOUTH EVERY 6 (SIX) HOURS AS NEEDED FOR MILD PAIN (PAIN SCORE 1-3). , Disp: , Rfl:   •  lidocaine (LIDODERM) 5 %, PLACE 1 PATCH ON THE SKIN DAILY FOR 7 DAYS. REMOVE & DISCARD PATCH WITHIN 12 HOURS OR AS DIRECTED BY MD, Disp: , Rfl:   •  LORazepam (Ativan) 0.5 mg tablet, Take 1 tablet by mouth 60 minutes prior to the scheduled procedure. If still feeling anxious may repeat 1 tablet orally 30 minutes before the procedure. Do not take more than 2 tablets. Do not drive, drink alcohol or operate heavy machinery after taking the medication. , Disp: 2 tablet, Rfl: 0  •  methocarbamol (ROBAXIN) 750 mg tablet, TAKE 1 TABLET (750 MG TOTAL) BY MOUTH 3 (THREE) TIMES A DAY AS NEEDED FOR MUSCLE SPASMS.  (Patient not taking: Reported on 4/24/2023), Disp: , Rfl:   •  naproxen (NAPROSYN) 500 mg tablet, TAKE 1 TABLET (500 MG TOTAL) BY MOUTH 2 (TWO) TIMES A DAY WITH MEALS. TAKE WITH MEALS. (Patient not taking: Reported on 4/24/2023), Disp: , Rfl:   •  Nerve Stimulator (TENS Therapy Pain Relief) REID, Use 1 application. 2 (two) times a day as needed (for lumbar radiculopathy), Disp: 1 each, Rfl: 0  •  omega-3-acid ethyl esters (LOVAZA) 1 g capsule, TAKE 2 CAPSULES (2 G TOTAL) BY MOUTH 2 (TWO) TIMES A DAY., Disp: , Rfl:   •  pantoprazole (PROTONIX) 40 mg tablet, Take 40 mg by mouth daily, Disp: , Rfl:     Current Facility-Administered Medications:   •  bupivacaine (PF) (MARCAINE) 0.25 % injection 1 mL, 1 mL, Epidural, Once, Will Chemo Peña MD  •  iohexol (OMNIPAQUE) 300 mg/mL injection 1 mL, 1 mL, Epidural, Once, Will Chemo Peña MD  •  methylPREDNISolone acetate (DEPO-MEDROL) injection 80 mg, 80 mg, Epidural, Once, Will Chemo Peña MD    No Known Allergies    Physical Exam:   Vitals:    08/17/23 0849   BP: 110/77   Pulse: 85   Resp: 16   Temp: 98.2 °F (36.8 °C)   SpO2: 98%     General: Awake, Alert, Oriented x 3, Mood and affect appropriate  Respiratory: Respirations even and unlabored  Cardiovascular: Peripheral pulses intact; no edema  Musculoskeletal Exam: limited lumbar spine ROM    ASA Score: 1    Patient/Chart Verification  Patient ID Verified: Verbal  Consents Confirmed: To be obtained in the Pre-Procedure area, Procedural  H&P( within 30 days) Verified: To be obtained in the Pre-Procedure area  Allergies Reviewed: Yes  Anticoag/NSAID held?: NA  Currently on antibiotics?: No  Pregnancy denied?: NA    Assessment:   1.  Lumbar radicular pain        Plan: L4-5 LESI

## 2023-08-24 ENCOUNTER — TELEPHONE (OUTPATIENT)
Dept: RADIOLOGY | Facility: MEDICAL CENTER | Age: 48
End: 2023-08-24

## 2023-09-22 ENCOUNTER — OFFICE VISIT (OUTPATIENT)
Dept: PAIN MEDICINE | Facility: CLINIC | Age: 48
End: 2023-09-22
Payer: OTHER MISCELLANEOUS

## 2023-09-22 VITALS
DIASTOLIC BLOOD PRESSURE: 68 MMHG | HEIGHT: 70 IN | SYSTOLIC BLOOD PRESSURE: 100 MMHG | WEIGHT: 187 LBS | BODY MASS INDEX: 26.77 KG/M2

## 2023-09-22 DIAGNOSIS — M54.16 LUMBAR RADICULITIS: ICD-10-CM

## 2023-09-22 DIAGNOSIS — M47.816 FACET ARTHROPATHY, LUMBAR: Primary | ICD-10-CM

## 2023-09-22 DIAGNOSIS — M47.817 LUMBOSACRAL SPONDYLOSIS WITHOUT MYELOPATHY: ICD-10-CM

## 2023-09-22 PROCEDURE — 99214 OFFICE O/P EST MOD 30 MIN: CPT | Performed by: ANESTHESIOLOGY

## 2023-09-22 NOTE — PROGRESS NOTES
Assessment:  1. Facet arthropathy, lumbar    2. Lumbosacral spondylosis without myelopathy    3. Lumbar radiculitis      Patient presents for follow-up with Worker's Compensation  also provided interpretation services. Plan:    Patient presenting with acute on chronic back pain for 6+ months, despite ongoing conservative therapies including formal physical therapy    Pain is consistent with lumbar radicular pain and lumbar spondylosis in  accompanied by pain at times>7/10 on the pain scale with inability to participate in IADLs for >6 weeks. Patient has fully participated with physical therapy as well as home exercises and stretches.  Has been taking NSAIDs, Tylenol, Lidocaine patches with modest benefit. Denies any gait instability, saddle anesthesia.      Reviewed and interpreted relevant imaging studies - specifically lumbar MRI and discussed the results and clinical significance with the patient. This showed multilevel degenerative disc and facet changes with mild to moderate stenosis at L4-5 and moderate bilateral foraminal narrowing at L4-5. Patient states that his lumbar radicular symptoms have improved significantly of 50% after his L4-5 interlaminar epidural steroid injection performed on 8/17/2023. However he continues to have significant axial bilateral low back pain that is elicited with facet mediated maneuvers. - Given the acute component of his axial back pain, and having failed conservative treatments for at least 6 weeks in the past 6 months, I offered him bilateral L3-4, L4-5, L5-S1 intra-articular facet injections. Patient is agreeable like to be scheduled.     Risks, benefits, and alternatives to steroid injections thoroughly discussed with patient. Complete risks and benefits including bleeding, infection, tissue reaction, nerve injury and allergic reaction were discussed. The approach was demonstrated using models and literature was provided.  Verbal consent was obtained. Patient will follow-up approximately 4 weeks after intra-articular facet injections.     - Reviewed external notes from the relevant aspects of the patient's medical record, specifically Orthopedic sports medicine, primary care physician notes in regards to current and prior treatments tried (as mentioned in history of present illness).    - Patient may return to work as tolerated without limitations. He would benefit from two 15-minute breaks during his workday to help him adhere with his home exercise program with physical therapy stretches. Reviewed pertinent laboratory studies, specifically renal function, hemoglobin A1c, CBC, coagulation studies, prior to recommending medication therapies/interventional treatment options.     [place] Prescription Drug Monitoring Program report was reviewed and was appropriate      My impressions and treatment recommendations were discussed in detail with the patient who verbalized understanding and had no further questions. Discharge instructions were provided. I personally saw and examined the patient and I agree with the above discussed plan of care. Orders Placed This Encounter   Procedures   • FL spine and pain procedure     Standing Status:   Future     Standing Expiration Date:   9/22/2027     Order Specific Question:   Reason for Exam:     Answer:   bilateral L3-4, L4-5, L5-S1 facet injections     Order Specific Question:   Anticoagulant hold needed? Answer:   no     No orders of the defined types were placed in this encounter. History of Present Illness:  Parvin Xavier is a 50 y.o. male who presents for a follow up office visit in regards to Back Pain (LESI f/u). The patient’s current symptoms include improved lumbar radicular symptoms but continued axial low back pain. Pain is rated a 3-5/10. Pain is described as a constant pressure-like, sharp pain rated with no typical pattern.     I have personally reviewed and/or updated the patient's past medical history, past surgical history, family history, social history, current medications, allergies, and vital signs today. Review of Systems   Constitutional: Negative for chills and fever. HENT: Negative for ear pain and sore throat. Eyes: Negative for pain and visual disturbance. Respiratory: Negative for cough and shortness of breath. Cardiovascular: Negative for chest pain and palpitations. Gastrointestinal: Negative for abdominal pain and vomiting. Genitourinary: Negative for dysuria and hematuria. Musculoskeletal: Positive for back pain, gait problem, myalgias and neck pain. Negative for arthralgias. Skin: Negative for color change and rash. Neurological: Negative for seizures and syncope. All other systems reviewed and are negative. Patient Active Problem List   Diagnosis   • Lumbar radicular pain       Past Medical History:   Diagnosis Date   • Asthma        History reviewed. No pertinent surgical history. History reviewed. No pertinent family history.     Social History     Occupational History   • Not on file   Tobacco Use   • Smoking status: Every Day     Types: Cigarettes   • Smokeless tobacco: Never   Vaping Use   • Vaping Use: Never used   Substance and Sexual Activity   • Alcohol use: Never   • Drug use: Never   • Sexual activity: Not on file       Current Outpatient Medications on File Prior to Visit   Medication Sig   • albuterol (PROVENTIL HFA,VENTOLIN HFA) 90 mcg/act inhaler Inhale 2 puffs every 4 (four) hours as needed   • albuterol (PROVENTIL HFA,VENTOLIN HFA) 90 mcg/act inhaler Inhale 2 puffs every 4 (four) hours as needed   • aspirin 81 mg chewable tablet Chew 81 mg if needed for mild pain   • cetirizine (ZyrTEC) 10 mg tablet Take 10 mg by mouth daily   • fexofenadine (ALLEGRA) 180 MG tablet Take 180 mg by mouth daily   • ibuprofen (MOTRIN) 600 mg tablet TAKE 1 TABLET (600 MG TOTAL) BY MOUTH EVERY 6 (SIX) HOURS AS NEEDED FOR MILD PAIN (PAIN SCORE 1-3). • lidocaine (LIDODERM) 5 % PLACE 1 PATCH ON THE SKIN DAILY FOR 7 DAYS. REMOVE & DISCARD PATCH WITHIN 12 HOURS OR AS DIRECTED BY MD   • LORazepam (Ativan) 0.5 mg tablet Take 1 tablet by mouth 60 minutes prior to the scheduled procedure. If still feeling anxious may repeat 1 tablet orally 30 minutes before the procedure. Do not take more than 2 tablets. Do not drive, drink alcohol or operate heavy machinery after taking the medication. • Nerve Stimulator (TENS Therapy Pain Relief) REID Use 1 application. 2 (two) times a day as needed (for lumbar radiculopathy)   • omega-3-acid ethyl esters (LOVAZA) 1 g capsule TAKE 2 CAPSULES (2 G TOTAL) BY MOUTH 2 (TWO) TIMES A DAY. • pantoprazole (PROTONIX) 40 mg tablet Take 40 mg by mouth daily   • acetaminophen (TYLENOL) 500 mg tablet TAKE 1 TABLET (500 MG TOTAL) BY MOUTH EVERY 6 (SIX) HOURS AS NEEDED FOR MILD PAIN (PAIN SCORE 1-3). (Patient not taking: Reported on 9/22/2023)   • cyclobenzaprine (FLEXERIL) 10 mg tablet Take 10 mg by mouth 2 (two) times a day as needed (Patient not taking: Reported on 4/24/2023)   • methocarbamol (ROBAXIN) 750 mg tablet TAKE 1 TABLET (750 MG TOTAL) BY MOUTH 3 (THREE) TIMES A DAY AS NEEDED FOR MUSCLE SPASMS. (Patient not taking: Reported on 4/24/2023)   • naproxen (NAPROSYN) 500 mg tablet TAKE 1 TABLET (500 MG TOTAL) BY MOUTH 2 (TWO) TIMES A DAY WITH MEALS. TAKE WITH MEALS. (Patient not taking: Reported on 4/24/2023)     No current facility-administered medications on file prior to visit. No Known Allergies    Physical Exam:    /68   Ht 5' 10" (1.778 m)   Wt 84.8 kg (187 lb)   BMI 26.83 kg/m²     Constitutional:normal, well developed, well nourished, alert, in no distress and non-toxic and no overt pain behavior.   Eyes:anicteric  HEENT:grossly intact  Neck:supple, symmetric, trachea midline and no masses   Pulmonary:even and unlabored  Cardiovascular:No edema or pitting edema present  Skin:Normal without rashes or lesions and well hydrated  Psychiatric:Mood and affect appropriate  Neurologic: No focal neurologic deficits. Musculoskeletal: Limited lumbar spine range of motion. Pain elicited with lumbar extension and lateral flexion. Imaging  MRI lumbar spine 6/9/23:  Multilevel degnerative disc and facet changes. Mild to moderate stenosis at L4-5.  Moderate bilateral foraminal narrowing at L4-5

## 2023-10-05 ENCOUNTER — TELEPHONE (OUTPATIENT)
Dept: PAIN MEDICINE | Facility: CLINIC | Age: 48
End: 2023-10-05

## 2023-10-05 NOTE — TELEPHONE ENCOUNTER
Caller: Nurse  Citlaly Rodriguez    Doctor: Ranjith Fam     Reason for call: they need a clarification they need a go back to work note without anymore specifications. If there is no restrictions the note should be clear.      Fax # [phone]    Call back#: [phone]

## 2023-10-12 ENCOUNTER — HOSPITAL ENCOUNTER (OUTPATIENT)
Dept: RADIOLOGY | Facility: MEDICAL CENTER | Age: 48
End: 2023-10-12
Payer: OTHER MISCELLANEOUS

## 2023-10-12 VITALS
RESPIRATION RATE: 18 BRPM | SYSTOLIC BLOOD PRESSURE: 137 MMHG | OXYGEN SATURATION: 100 % | HEART RATE: 96 BPM | TEMPERATURE: 99.2 F | DIASTOLIC BLOOD PRESSURE: 91 MMHG

## 2023-10-12 DIAGNOSIS — M47.816 FACET ARTHROPATHY, LUMBAR: ICD-10-CM

## 2023-10-12 DIAGNOSIS — M47.817 LUMBOSACRAL SPONDYLOSIS WITHOUT MYELOPATHY: ICD-10-CM

## 2023-10-12 PROCEDURE — 64495 INJ PARAVERT F JNT L/S 3 LEV: CPT | Performed by: ANESTHESIOLOGY

## 2023-10-12 PROCEDURE — 64493 INJ PARAVERT F JNT L/S 1 LEV: CPT | Performed by: ANESTHESIOLOGY

## 2023-10-12 PROCEDURE — 64494 INJ PARAVERT F JNT L/S 2 LEV: CPT | Performed by: ANESTHESIOLOGY

## 2023-10-12 RX ORDER — LIDOCAINE HYDROCHLORIDE 10 MG/ML
3 INJECTION, SOLUTION EPIDURAL; INFILTRATION; INTRACAUDAL; PERINEURAL ONCE
Status: DISCONTINUED | OUTPATIENT
Start: 2023-10-12 | End: 2023-10-12

## 2023-10-12 RX ORDER — BUPIVACAINE HCL/PF 2.5 MG/ML
5 VIAL (ML) INJECTION ONCE
Status: COMPLETED | OUTPATIENT
Start: 2023-10-12 | End: 2023-10-12

## 2023-10-12 RX ORDER — METHYLPREDNISOLONE ACETATE 80 MG/ML
80 INJECTION, SUSPENSION INTRA-ARTICULAR; INTRALESIONAL; INTRAMUSCULAR; PARENTERAL; SOFT TISSUE ONCE
Status: COMPLETED | OUTPATIENT
Start: 2023-10-12 | End: 2023-10-12

## 2023-10-12 RX ADMIN — METHYLPREDNISOLONE ACETATE 80 MG: 80 INJECTION, SUSPENSION INTRA-ARTICULAR; INTRALESIONAL; INTRAMUSCULAR; PARENTERAL; SOFT TISSUE at 09:38

## 2023-10-12 RX ADMIN — Medication 5 ML: at 09:38

## 2023-10-12 RX ADMIN — Medication 5 ML: at 09:35

## 2023-10-12 NOTE — H&P
History of Present Illness: The patient is a 50 y.o. male who presents with complaints of back pain    Past Medical History:   Diagnosis Date    Asthma        No past surgical history on file. Current Outpatient Medications:     acetaminophen (TYLENOL) 500 mg tablet, TAKE 1 TABLET (500 MG TOTAL) BY MOUTH EVERY 6 (SIX) HOURS AS NEEDED FOR MILD PAIN (PAIN SCORE 1-3). (Patient not taking: Reported on 9/22/2023), Disp: , Rfl:     albuterol (PROVENTIL HFA,VENTOLIN HFA) 90 mcg/act inhaler, Inhale 2 puffs every 4 (four) hours as needed, Disp: , Rfl:     albuterol (PROVENTIL HFA,VENTOLIN HFA) 90 mcg/act inhaler, Inhale 2 puffs every 4 (four) hours as needed, Disp: , Rfl:     aspirin 81 mg chewable tablet, Chew 81 mg if needed for mild pain, Disp: , Rfl:     cetirizine (ZyrTEC) 10 mg tablet, Take 10 mg by mouth daily, Disp: , Rfl:     cyclobenzaprine (FLEXERIL) 10 mg tablet, Take 10 mg by mouth 2 (two) times a day as needed (Patient not taking: Reported on 4/24/2023), Disp: , Rfl:     fexofenadine (ALLEGRA) 180 MG tablet, Take 180 mg by mouth daily, Disp: , Rfl:     ibuprofen (MOTRIN) 600 mg tablet, TAKE 1 TABLET (600 MG TOTAL) BY MOUTH EVERY 6 (SIX) HOURS AS NEEDED FOR MILD PAIN (PAIN SCORE 1-3). , Disp: , Rfl:     lidocaine (LIDODERM) 5 %, PLACE 1 PATCH ON THE SKIN DAILY FOR 7 DAYS. REMOVE & DISCARD PATCH WITHIN 12 HOURS OR AS DIRECTED BY MD, Disp: , Rfl:     LORazepam (Ativan) 0.5 mg tablet, Take 1 tablet by mouth 60 minutes prior to the scheduled procedure. If still feeling anxious may repeat 1 tablet orally 30 minutes before the procedure. Do not take more than 2 tablets. Do not drive, drink alcohol or operate heavy machinery after taking the medication. , Disp: 2 tablet, Rfl: 0    methocarbamol (ROBAXIN) 750 mg tablet, TAKE 1 TABLET (750 MG TOTAL) BY MOUTH 3 (THREE) TIMES A DAY AS NEEDED FOR MUSCLE SPASMS.  (Patient not taking: Reported on 4/24/2023), Disp: , Rfl:     naproxen (NAPROSYN) 500 mg tablet, TAKE 1 TABLET (500 MG TOTAL) BY MOUTH 2 (TWO) TIMES A DAY WITH MEALS. TAKE WITH MEALS. (Patient not taking: Reported on 4/24/2023), Disp: , Rfl:     Nerve Stimulator (TENS Therapy Pain Relief) REID, Use 1 application. 2 (two) times a day as needed (for lumbar radiculopathy), Disp: 1 each, Rfl: 0    omega-3-acid ethyl esters (LOVAZA) 1 g capsule, TAKE 2 CAPSULES (2 G TOTAL) BY MOUTH 2 (TWO) TIMES A DAY., Disp: , Rfl:     pantoprazole (PROTONIX) 40 mg tablet, Take 40 mg by mouth daily, Disp: , Rfl:     Current Facility-Administered Medications:     bupivacaine (PF) (MARCAINE) 0.25 % injection 5 mL, 5 mL, Intra-articular, Once, Will Miguel Angel Michaels MD    lidocaine (PF) (XYLOCAINE-MPF) 1 % injection 3 mL, 3 mL, Infiltration, Once, Will Miguel Angel Michaels MD    methylPREDNISolone acetate (DEPO-MEDROL) injection 80 mg, 80 mg, Intra-articular, Once, Will Miguel Angel Michaels MD    No Known Allergies    Physical Exam:   Vitals:    10/12/23 0919   BP: 122/82   Pulse: 97   Resp: 16   Temp: 99.2 °F (37.3 °C)   SpO2: 98%     General: Awake, Alert, Oriented x 3, Mood and affect appropriate  Respiratory: Respirations even and unlabored  Cardiovascular: Peripheral pulses intact; no edema  Musculoskeletal Exam: pain with lumbar ROM    ASA Score: 1    Patient/Chart Verification  Patient ID Verified: Verbal  Consents Confirmed: Procedural, To be obtained in the Pre-Procedure area  H&P( within 30 days) Verified: To be obtained in the Pre-Procedure area  Allergies Reviewed: Yes  Anticoag/NSAID held?: NA  Currently on antibiotics?: No  Pregnancy denied?: NA    Assessment:   1. Facet arthropathy, lumbar    2.  Lumbosacral spondylosis without myelopathy        Plan: bilateral L3-4, L4-5, L5-S1 facet injections

## 2023-10-12 NOTE — DISCHARGE INSTRUCTIONS
Steroid Joint Injection   WHAT YOU NEED TO KNOW:   A steroid joint injection is a procedure to inject steroid medicine into a joint. Steroid medicine decreases pain and inflammation. The injection may also contain an anesthetic (numbing medicine) to decrease pain. It may be done to treat conditions such as arthritis, gout, or carpal tunnel syndrome. The injections may be given in your knee, ankle, shoulder, elbow, wrist, ankle or sacroiliac joint. Do not apply heat to any area that is numb. If you have discomfort or soreness at the injection site, you may apply ice today, 20 minutes on and 20 minutes off. Tomorrow you may use ice or warm, moist heat. Do not apply ice or heat directly to the skin. You may have an increase or change in the discomfort for 36-48 hours after your treatment. Apply ice and continue with any pain medicine you have been prescribed. Do not do anything strenuous today. You may shower, but no tub baths or hot tubs today. You may resume your normal activities tomorrow, but do not “overdo it”. Resume normal activities slowly when you are feeling better. If you experience redness, drainage or swelling at the injection site, or if you develop a fever above 100 degrees, please call [facility] at [phone] or go to the Emergency Room. Continue to take all routine medicines prescribed by your primary care physician unless otherwise instructed by our staff. Most blood thinners should be started again according to your regularly scheduled dosing. If you have any questions, please give our office a call. As no general anesthesia was used in today's procedure, you should not experience any side effects related to anesthesia. If you are diabetic, the steroids used in today's injection may temporarily increase your blood sugar levels after the first few days after your injection.  Please keep a close eye on your sugars and alert the doctor who manages your diabetes if your sugars are significantly high from your baseline or you are symptomatic. If you have a problem specifically related to your procedure, please call our office at [phone]. Problems not related to your procedure should be directed to your primary care physician. INSTRUCCIONES PARA EL ANA POSTERIOR AL PROCEDIMIENTO DE INYECCIÓN EN BHUPINDER ARTICULACIÓN    No aplique calor en ninguna área que esté entumecida. Si siente molestias o dolor en el lugar de la inyección, por hoy puede colocar hielo tanna 20 minutos y retirarlo tanna otros 20 minutos. A partir de [employer], podrá utilizar hielo o calor húmedo. No aplique hielo o calor directo sobre la piel. Puede experimentar un aumento o cambio en el malestar tanna las 36-48 horas posteriores al tratamiento. Aplique hielo y continúe tomando cualquier analgésico que le hayan recetado. No realice ninguna actividad extenuante, por hoy. Puede ducharse, ry no se bañe en tinas ni en jacuzzis por willyy. Puede retomar stacy actividades normales mañana, ry "no se exceda". Retome stacy actividades normales gradualmente a medida que se sienta mejor. Si nota enrojecimiento, secreción o inflamación en el sitio donde lo inyectaron, o si presenta fiebre superior a 100 grados, llame a [facility] al [phone] o acuda a la seven de [place]. Continúe tomando todos los [employer] de rutina que le haya recetado nielsen médico de cabecera, a menos que nuestro personal le indique lo contrario. Kamryn Has a sumit la mayoría de los anticoagulantes de acuerdo con la dosis que tiene programada regularmente. Si tiene [employer], llame a [employer]. Si tiene algún problema relacionado específicamente con el procedimiento, llame a nuestro consultorio al [phone]. Si tiene problemas que no estén relacionados con nielsen procedimiento, debe comunicarse con nielsen médico de cabecera.

## 2023-10-19 ENCOUNTER — TELEPHONE (OUTPATIENT)
Dept: PAIN MEDICINE | Facility: CLINIC | Age: 48
End: 2023-10-19

## 2023-11-10 ENCOUNTER — OFFICE VISIT (OUTPATIENT)
Dept: PAIN MEDICINE | Facility: CLINIC | Age: 48
End: 2023-11-10
Payer: OTHER MISCELLANEOUS

## 2023-11-10 VITALS
BODY MASS INDEX: 26.77 KG/M2 | HEIGHT: 70 IN | DIASTOLIC BLOOD PRESSURE: 84 MMHG | SYSTOLIC BLOOD PRESSURE: 122 MMHG | WEIGHT: 187 LBS

## 2023-11-10 DIAGNOSIS — M54.16 LUMBAR RADICULITIS: Primary | ICD-10-CM

## 2023-11-10 DIAGNOSIS — M47.816 LUMBAR SPONDYLOSIS: ICD-10-CM

## 2023-11-10 DIAGNOSIS — M48.061 LUMBAR FORAMINAL STENOSIS: ICD-10-CM

## 2023-11-10 PROCEDURE — 99213 OFFICE O/P EST LOW 20 MIN: CPT | Performed by: ANESTHESIOLOGY

## 2023-11-10 NOTE — PROGRESS NOTES
Assessment:  1. Lumbar radiculitis    2. Lumbar spondylosis    3. Lumbar foraminal stenosis      Patient presents for follow-up with Worker's Compensation . Plan:    Patient presenting with acute on chronic back pain for 1 year, despite ongoing conservative therapies including formal physical therapy     Pain is consistent with lumbar radicular pain and lumbar spondylosis in accompanied by pain at times>7/10 on the pain scale with inability to participate in IADLs for >6 weeks. Patient has fully participated with physical therapy as well as home exercises and stretches. Has been taking NSAIDs, Tylenol, Lidocaine patches with modest benefit. Denies any gait instability, saddle anesthesia. Reviewed and interpreted relevant imaging studies - specifically lumbar MRI and discussed the results and clinical significance with the patient. This showed multilevel degenerative disc and facet changes with mild to moderate stenosis at L4-5 and moderate bilateral foraminal narrowing at L4-5. Patient states that his lumbar radicular symptoms have improved significantly of 50% after his L4-5 interlaminar epidural steroid injection performed on 8/17/2023. He also now notes 50% improvement of his axial back symptoms after recent bilateral L3-4, L4-5, L5-S1 facet injections. Discussed that he can follow up on an as needed basis for either his lumbar radicular symptoms or axial LBP symptoms when they become more significant in the future. .    - Reviewed external notes from the relevant aspects of the patient's medical record, specifically Orthopedic sports medicine, primary care physician notes in regards to current and prior treatments tried (as mentioned in history of present illness). - Patient may return to work as tolerated without limitations. He would benefit from two 15-minute breaks during his workday to help him adhere with his home exercise program with physical therapy stretches. Reviewed pertinent laboratory studies, specifically renal function, hemoglobin A1c, CBC, coagulation studies, prior to recommending medication therapies/interventional treatment options. [place] Prescription Drug Monitoring Program report was reviewed and was appropriate      My impressions and treatment recommendations were discussed in detail with the patient who verbalized understanding and had no further questions. Discharge instructions were provided. I personally saw and examined the patient and I agree with the above discussed plan of care. No orders of the defined types were placed in this encounter. No orders of the defined types were placed in this encounter. History of Present Illness:  Zane Gonzales is a 50 y.o. male who presents for a follow up office visit in regards to Back Pain. The patient’s current symptoms include improved low back pain symptoms. Pain is rated a 4/10. Pain is described as a constant, pressure-like, sharp pain with no typical pattern. I have personally reviewed and/or updated the patient's past medical history, past surgical history, family history, social history, current medications, allergies, and vital signs today. Review of Systems   Constitutional:  Negative for chills and fever. HENT:  Negative for ear pain and sore throat. Eyes:  Negative for pain and visual disturbance. Respiratory:  Negative for cough and shortness of breath. Cardiovascular:  Negative for chest pain and palpitations. Gastrointestinal:  Negative for abdominal pain and vomiting. Genitourinary:  Negative for dysuria and hematuria. Musculoskeletal:  Positive for back pain, gait problem and myalgias. Negative for arthralgias. Skin:  Negative for color change and rash. Neurological:  Negative for seizures and syncope. All other systems reviewed and are negative.       Patient Active Problem List   Diagnosis    Lumbar radicular pain    Facet arthropathy, lumbar Past Medical History:   Diagnosis Date    Asthma        History reviewed. No pertinent surgical history. History reviewed. No pertinent family history. Social History     Occupational History    Not on file   Tobacco Use    Smoking status: Every Day     Types: Cigarettes    Smokeless tobacco: Never   Vaping Use    Vaping Use: Never used   Substance and Sexual Activity    Alcohol use: Never    Drug use: Never    Sexual activity: Not on file       Current Outpatient Medications on File Prior to Visit   Medication Sig    albuterol (PROVENTIL HFA,VENTOLIN HFA) 90 mcg/act inhaler Inhale 2 puffs every 4 (four) hours as needed    albuterol (PROVENTIL HFA,VENTOLIN HFA) 90 mcg/act inhaler Inhale 2 puffs every 4 (four) hours as needed    aspirin 81 mg chewable tablet Chew 81 mg if needed for mild pain    cetirizine (ZyrTEC) 10 mg tablet Take 10 mg by mouth daily    fexofenadine (ALLEGRA) 180 MG tablet Take 180 mg by mouth daily    ibuprofen (MOTRIN) 600 mg tablet TAKE 1 TABLET (600 MG TOTAL) BY MOUTH EVERY 6 (SIX) HOURS AS NEEDED FOR MILD PAIN (PAIN SCORE 1-3). lidocaine (LIDODERM) 5 % PLACE 1 PATCH ON THE SKIN DAILY FOR 7 DAYS. REMOVE & DISCARD PATCH WITHIN 12 HOURS OR AS DIRECTED BY MD    LORazepam (Ativan) 0.5 mg tablet Take 1 tablet by mouth 60 minutes prior to the scheduled procedure. If still feeling anxious may repeat 1 tablet orally 30 minutes before the procedure. Do not take more than 2 tablets. Do not drive, drink alcohol or operate heavy machinery after taking the medication. Nerve Stimulator (TENS Therapy Pain Relief) REID Use 1 application. 2 (two) times a day as needed (for lumbar radiculopathy)    omega-3-acid ethyl esters (LOVAZA) 1 g capsule TAKE 2 CAPSULES (2 G TOTAL) BY MOUTH 2 (TWO) TIMES A DAY.     pantoprazole (PROTONIX) 40 mg tablet Take 40 mg by mouth daily    acetaminophen (TYLENOL) 500 mg tablet TAKE 1 TABLET (500 MG TOTAL) BY MOUTH EVERY 6 (SIX) HOURS AS NEEDED FOR MILD PAIN (PAIN SCORE 1-3). (Patient not taking: Reported on 9/22/2023)    cyclobenzaprine (FLEXERIL) 10 mg tablet Take 10 mg by mouth 2 (two) times a day as needed (Patient not taking: Reported on 4/24/2023)    methocarbamol (ROBAXIN) 750 mg tablet TAKE 1 TABLET (750 MG TOTAL) BY MOUTH 3 (THREE) TIMES A DAY AS NEEDED FOR MUSCLE SPASMS. (Patient not taking: Reported on 4/24/2023)    naproxen (NAPROSYN) 500 mg tablet TAKE 1 TABLET (500 MG TOTAL) BY MOUTH 2 (TWO) TIMES A DAY WITH MEALS. TAKE WITH MEALS. (Patient not taking: Reported on 4/24/2023)     No current facility-administered medications on file prior to visit. No Known Allergies    Physical Exam:    /84   Ht 5' 10" (1.778 m)   Wt 84.8 kg (187 lb)   BMI 26.83 kg/m²     Constitutional:normal, well developed, well nourished, alert, in no distress and non-toxic and no overt pain behavior. Eyes:anicteric  HEENT:grossly intact  Neck:supple, symmetric, trachea midline and no masses   Pulmonary:even and unlabored  Cardiovascular:No edema or pitting edema present  Skin:Normal without rashes or lesions and well hydrated  Psychiatric:Mood and affect appropriate  Neurologic: Motor function is grossly intact with no focal neurologic deficits   Musculoskeletal: Limited lumbar spine ROM. Imaging  MRI lumbar spine 6/9/23:  Multilevel degnerative disc and facet changes. Mild to moderate stenosis at L4-5.  Moderate bilateral foraminal narrowing at L4-5

## 2023-11-10 NOTE — PROGRESS NOTES
Assessment:  No diagnosis found. Plan:  ***    {Oral Swab Statement:67806}    {Opioid Statement:97170}    {UDS Statement:67715}    {PDMP Statement:77197}    {Pain Management Procedure Statement:19774}    My impressions and treatment recommendations were discussed in detail with the patient who verbalized understanding and had no further questions. Discharge instructions were provided. I personally saw and examined the patient and I agree with the above discussed plan of care. No orders of the defined types were placed in this encounter. No orders of the defined types were placed in this encounter. History of Present Illness:  Hattie Barrera is a 50 y.o. male who presents for a follow up office visit in regards to No chief complaint on file. .   The patient’s current symptoms include ***    {SPA Pain Assessment:39044}    Opioid contract date    Last UDS Date                      last taken on        I have personally reviewed and/or updated the patient's past medical history, past surgical history, family history, social history, current medications, allergies, and vital signs today. Review of Systems    Patient Active Problem List   Diagnosis    Lumbar radicular pain    Facet arthropathy, lumbar       Past Medical History:   Diagnosis Date    Asthma        History reviewed. No pertinent surgical history. History reviewed. No pertinent family history. Social History     Occupational History    Not on file   Tobacco Use    Smoking status: Every Day     Types: Cigarettes    Smokeless tobacco: Never   Vaping Use    Vaping Use: Never used   Substance and Sexual Activity    Alcohol use: Never    Drug use: Never    Sexual activity: Not on file       Current Outpatient Medications on File Prior to Visit   Medication Sig    acetaminophen (TYLENOL) 500 mg tablet TAKE 1 TABLET (500 MG TOTAL) BY MOUTH EVERY 6 (SIX) HOURS AS NEEDED FOR MILD PAIN (PAIN SCORE 1-3).  (Patient not taking: Reported on 9/22/2023) albuterol (PROVENTIL HFA,VENTOLIN HFA) 90 mcg/act inhaler Inhale 2 puffs every 4 (four) hours as needed    albuterol (PROVENTIL HFA,VENTOLIN HFA) 90 mcg/act inhaler Inhale 2 puffs every 4 (four) hours as needed    aspirin 81 mg chewable tablet Chew 81 mg if needed for mild pain    cetirizine (ZyrTEC) 10 mg tablet Take 10 mg by mouth daily    cyclobenzaprine (FLEXERIL) 10 mg tablet Take 10 mg by mouth 2 (two) times a day as needed (Patient not taking: Reported on 4/24/2023)    fexofenadine (ALLEGRA) 180 MG tablet Take 180 mg by mouth daily    ibuprofen (MOTRIN) 600 mg tablet TAKE 1 TABLET (600 MG TOTAL) BY MOUTH EVERY 6 (SIX) HOURS AS NEEDED FOR MILD PAIN (PAIN SCORE 1-3). lidocaine (LIDODERM) 5 % PLACE 1 PATCH ON THE SKIN DAILY FOR 7 DAYS. REMOVE & DISCARD PATCH WITHIN 12 HOURS OR AS DIRECTED BY MD    LORazepam (Ativan) 0.5 mg tablet Take 1 tablet by mouth 60 minutes prior to the scheduled procedure. If still feeling anxious may repeat 1 tablet orally 30 minutes before the procedure. Do not take more than 2 tablets. Do not drive, drink alcohol or operate heavy machinery after taking the medication. methocarbamol (ROBAXIN) 750 mg tablet TAKE 1 TABLET (750 MG TOTAL) BY MOUTH 3 (THREE) TIMES A DAY AS NEEDED FOR MUSCLE SPASMS. (Patient not taking: Reported on 4/24/2023)    naproxen (NAPROSYN) 500 mg tablet TAKE 1 TABLET (500 MG TOTAL) BY MOUTH 2 (TWO) TIMES A DAY WITH MEALS. TAKE WITH MEALS. (Patient not taking: Reported on 4/24/2023)    Nerve Stimulator (TENS Therapy Pain Relief) REID Use 1 application. 2 (two) times a day as needed (for lumbar radiculopathy)    omega-3-acid ethyl esters (LOVAZA) 1 g capsule TAKE 2 CAPSULES (2 G TOTAL) BY MOUTH 2 (TWO) TIMES A DAY. pantoprazole (PROTONIX) 40 mg tablet Take 40 mg by mouth daily     No current facility-administered medications on file prior to visit.        No Known Allergies    Physical Exam:    There were no vitals taken for this visit. Constitutional:{General Appearance:98665::"normal, well developed, well nourished, alert, in no distress and non-toxic and no overt pain behavior. "}  Eyes:{Sclera:69743::"anicteric"}  HEENT:{[place]ear[place]::"grossly intact"}  Neck:{Neck:25234::"supple, symmetric, trachea midline and no masses "}  Pulmonary:{Respiratory effort:43252::"even and unlabored"}  Cardiovascular:{Examination of Extremities:81571::"No edema or pitting edema present"}  Skin:{Skin and Subcutaneous tissues:15342::"Normal without rashes or lesions and well hydrated"}  Psychiatric:{Mood and Affect:80010::"Mood and affect appropriate"}  Neurologic:{Cranial Nerves:82827::"Cranial Nerves II-XII grossly intact"}  Musculoskeletal:{[place]:56080::"normal"}    Imaging

## 2025-06-14 ENCOUNTER — HOSPITAL ENCOUNTER (EMERGENCY)
Facility: HOSPITAL | Age: 50
Discharge: HOME | End: 2025-06-15
Attending: EMERGENCY MEDICINE | Admitting: EMERGENCY MEDICINE
Payer: COMMERCIAL

## 2025-06-14 VITALS
OXYGEN SATURATION: 98 % | RESPIRATION RATE: 18 BRPM | TEMPERATURE: 97.8 F | DIASTOLIC BLOOD PRESSURE: 80 MMHG | HEART RATE: 114 BPM | SYSTOLIC BLOOD PRESSURE: 177 MMHG

## 2025-06-14 DIAGNOSIS — S61.019A THUMB LACERATION: Primary | ICD-10-CM

## 2025-06-14 PROCEDURE — 99284 EMERGENCY DEPT VISIT MOD MDM: CPT | Performed by: EMERGENCY MEDICINE

## 2025-06-14 PROCEDURE — 99283 EMERGENCY DEPT VISIT LOW MDM: CPT

## 2025-06-14 PROCEDURE — 12001 RPR S/N/AX/GEN/TRNK 2.5CM/<: CPT | Performed by: EMERGENCY MEDICINE

## 2025-06-15 RX ORDER — IBUPROFEN 600 MG/1
600 TABLET, FILM COATED ORAL ONCE
Status: COMPLETED | OUTPATIENT
Start: 2025-06-15 | End: 2025-06-15

## 2025-06-15 RX ORDER — LIDOCAINE HYDROCHLORIDE 10 MG/ML
10 INJECTION, SOLUTION EPIDURAL; INFILTRATION; INTRACAUDAL; PERINEURAL ONCE
Status: COMPLETED | OUTPATIENT
Start: 2025-06-15 | End: 2025-06-15

## 2025-06-15 RX ORDER — ACETAMINOPHEN 325 MG/1
650 TABLET ORAL ONCE
Status: COMPLETED | OUTPATIENT
Start: 2025-06-15 | End: 2025-06-15

## 2025-06-15 RX ADMIN — IBUPROFEN 600 MG: 600 TABLET, FILM COATED ORAL at 00:17

## 2025-06-15 RX ADMIN — LIDOCAINE HYDROCHLORIDE 10 ML: 10 INJECTION, SOLUTION EPIDURAL; INFILTRATION; INTRACAUDAL at 00:17

## 2025-06-15 RX ADMIN — ACETAMINOPHEN 650 MG: 325 TABLET ORAL at 00:17

## 2025-06-15 NOTE — ED ATTENDING ATTESTATION
6/14/2025  I, Shiv Villanueva MD, saw and evaluated the patient. I have discussed the patient with the resident/non-physician practitioner and agree with the resident's/non-physician practitioner's findings, Plan of Care, and MDM as documented in the resident's/non-physician practitioner's note, except where noted. All available labs and Radiology studies were reviewed.  I was present for key portions of any procedure(s) performed by the resident/non-physician practitioner and I was immediately available to provide assistance.       At this point I agree with the current assessment done in the Emergency Department.  I have conducted an independent evaluation of this patient a history and physical is as follows:    50-year-old man presenting with 2 cm right thumb laceration.  Bleeding controlled.  Does not go past subcutaneous tissue.  Range of motion intact and distal sensory intact.  Will repair laceration.    ED Course         Critical Care Time  Procedures

## 2025-06-15 NOTE — DISCHARGE INSTRUCTIONS
You were seen in the Emergency Department today for thumb laceration.  Can take Motrin and Tylenol as needed for pain.  Do not get the wound wet for the next 24 hrs.   Do not scrub the area.   Can let water and soap run over the area. Keep it dry and clean.   6 sutures were placed. They can be removed in 7-10 days at any emergency department, urgent care, or at your family doctor's office.    Please return to the Emergency Department if you experience worsening of your current symptoms or any other concerning symptoms including uncontrolled pain, fever, pus coming from the wound.

## 2025-06-15 NOTE — ED PROVIDER NOTES
"Time reflects when diagnosis was documented in both MDM as applicable and the Disposition within this note       Time User Action Codes Description Comment    6/15/2025 12:59 AM Latoya Serra Add [S61.019A] Thumb laceration           ED Disposition       ED Disposition   Discharge    Condition   Stable    Date/Time   Sun Guicho 15, 2025 12:59 AM    Comment   Derrell China discharge to home/self care.                   Assessment & Plan       Medical Decision Making  Risk  OTC drugs.  Prescription drug management.      Patient is a 50 y.o. male who presents to the ED with right thumb laceration.    Vital signs stable, afebrile. On exam in no acute distress. 2cm laceration to right thumb.     History and physical exam most consistent with laceration.     Plan: Tylenol/Motrin. Will require suture repair.     View ED course for further discussion on patient workup.     On review of previous records tdap 3/1/2023.    Disposition: Stable for discharge. Strict return to ED precautions provided. Advised to follow up with PCP within 1 week for re-evaluation and further management. Patient verbalized understanding and agrees with the plan of care.       Portions of the record may have been created with voice recognition software. Occasional wrong word or \"sound a like\" substitutions may have occurred due to the inherent limitations of voice recognition software. Read the chart carefully and recognize, using context, where substitutions have occurred.         Medications   acetaminophen (TYLENOL) tablet 650 mg (650 mg Oral Given 6/15/25 0017)   ibuprofen (MOTRIN) tablet 600 mg (600 mg Oral Given 6/15/25 0017)   lidocaine (PF) (XYLOCAINE-MPF) 1 % injection 10 mL (10 mL Infiltration Given by Other 6/15/25 0017)       ED Risk Strat Scores                    No data recorded                            History of Present Illness       Chief Complaint   Patient presents with    Hand Injury     Pt was cutting up pork and cut his right " hand thumb.        Past Medical History[1]   Past Surgical History[2]   Family History[3]   Social History[4]   E-Cigarette/Vaping    E-Cigarette Use Never User       E-Cigarette/Vaping Substances      I have reviewed and agree with the history as documented.     HPI  Patient is a 50 y.o. male who presents to the ED for evaluation of right thumb laceration. Reports was cutting pork when he accidentally cut his thumb around 930pm today. Thinks his tetanus vaccine is UTD. Endorses mild pain at laceration site. Denies numbness, tingling, weakness in hand or thumb.       Review of Systems   Skin:         Right thumb laceration            Objective       ED Triage Vitals [06/14/25 2323]   Temperature Pulse Blood Pressure Respirations SpO2 Patient Position - Orthostatic VS   97.8 °F (36.6 °C) (!) 114 (!) 177/80 18 98 % Sitting      Temp Source Heart Rate Source BP Location FiO2 (%) Pain Score    Temporal Monitor Left arm -- No Pain      Vitals      Date and Time Temp Pulse SpO2 Resp BP Pain Score FACES Pain Rating User   06/15/25 0017 -- -- -- -- -- 6 -- PS   06/14/25 2323 97.8 °F (36.6 °C) 114 98 % 18 177/80 No Pain -- CRAIG            Physical Exam  Vitals and nursing note reviewed.   Constitutional:       General: He is not in acute distress.     Appearance: Normal appearance. He is well-developed. He is not ill-appearing, toxic-appearing or diaphoretic.   HENT:      Head: Normocephalic and atraumatic.     Eyes:      General:         Right eye: No discharge.         Left eye: No discharge.      Conjunctiva/sclera: Conjunctivae normal.       Cardiovascular:      Rate and Rhythm: Normal rate.   Pulmonary:      Effort: Pulmonary effort is normal. No respiratory distress.     Musculoskeletal:         General: No swelling.      Cervical back: Normal range of motion and neck supple.     Skin:     General: Skin is warm and dry.      Capillary Refill: Capillary refill takes less than 2 seconds.      Comments: 2cm laceration to  base of right thumb  <2 cap refill   Normal ROM of thumb  SILT      Neurological:      General: No focal deficit present.      Mental Status: He is alert and oriented to person, place, and time.     Psychiatric:         Mood and Affect: Mood normal.         Results Reviewed       None            No orders to display         Universal Protocol:  procedure performed by consultantConsent: Verbal consent obtained  Risks and benefits: risks, benefits and alternatives were discussed  Consent given by: patient  Patient understanding: patient states understanding of the procedure being performed  Patient consent: the patient's understanding of the procedure matches consent given  Required items: required blood products, implants, devices, and special equipment available  Patient identity confirmed: verbally with patient and arm band  Laceration repair    Date/Time: 6/15/2025 1:03 AM    Performed by: Latoya Serra MD  Authorized by: Latoya Serra MD  Body area: upper extremity  Location details: right thumb  Laceration length: 2 cm  Foreign bodies: no foreign bodies  Tendon involvement: none  Nerve involvement: none  Anesthesia: local infiltration    Anesthesia:  Local Anesthetic: lidocaine 1% without epinephrine    Sedation:  Patient sedated: no      Wound Dehiscence:  Superficial Wound Dehiscence: simple closure      Procedure Details:  Preparation: Patient was prepped and draped in the usual sterile fashion.  Irrigation solution: saline  Irrigation method: jet lavage  Amount of cleaning: extensive  Debridement: none  Degree of undermining: none  Wound skin closure material used: 5-0 Ethilon.  Number of sutures: 6  Technique: simple  Approximation: close  Approximation difficulty: simple  Dressing: nonadherent dressing, gauze.  Patient tolerance: patient tolerated the procedure well with no immediate complications          ED Medication and Procedure Management   Prior to Admission Medications   Prescriptions Last Dose  Informant Patient Reported? Taking?   LORazepam (Ativan) 0.5 mg tablet  Self No No   Sig: Take 1 tablet by mouth 60 minutes prior to the scheduled procedure.  If still feeling anxious may repeat 1 tablet orally 30 minutes before the procedure.  Do not take more than 2 tablets.  Do not drive, drink alcohol or operate heavy machinery after taking the medication.   Nerve Stimulator (TENS Therapy Pain Relief) REID  Self No No   Sig: Use 1 application. 2 (two) times a day as needed (for lumbar radiculopathy)   acetaminophen (TYLENOL) 500 mg tablet  Self Yes No   Sig: TAKE 1 TABLET (500 MG TOTAL) BY MOUTH EVERY 6 (SIX) HOURS AS NEEDED FOR MILD PAIN (PAIN SCORE 1-3).   Patient not taking: Reported on 9/22/2023   albuterol (PROVENTIL HFA,VENTOLIN HFA) 90 mcg/act inhaler  Self Yes No   Sig: Inhale 2 puffs every 4 (four) hours as needed   albuterol (PROVENTIL HFA,VENTOLIN HFA) 90 mcg/act inhaler  Self Yes No   Sig: Inhale 2 puffs every 4 (four) hours as needed   aspirin 81 mg chewable tablet  Self Yes No   Sig: Chew 81 mg if needed for mild pain   cetirizine (ZyrTEC) 10 mg tablet  Self Yes No   Sig: Take 10 mg by mouth daily   cyclobenzaprine (FLEXERIL) 10 mg tablet  Self Yes No   Sig: Take 10 mg by mouth 2 (two) times a day as needed   Patient not taking: Reported on 4/24/2023   fexofenadine (ALLEGRA) 180 MG tablet  Self Yes No   Sig: Take 180 mg by mouth daily   ibuprofen (MOTRIN) 600 mg tablet  Self Yes No   Sig: TAKE 1 TABLET (600 MG TOTAL) BY MOUTH EVERY 6 (SIX) HOURS AS NEEDED FOR MILD PAIN (PAIN SCORE 1-3).   lidocaine (LIDODERM) 5 %  Self Yes No   Sig: PLACE 1 PATCH ON THE SKIN DAILY FOR 7 DAYS. REMOVE & DISCARD PATCH WITHIN 12 HOURS OR AS DIRECTED BY MD   methocarbamol (ROBAXIN) 750 mg tablet  Self Yes No   Sig: TAKE 1 TABLET (750 MG TOTAL) BY MOUTH 3 (THREE) TIMES A DAY AS NEEDED FOR MUSCLE SPASMS.   Patient not taking: Reported on 4/24/2023   naproxen (NAPROSYN) 500 mg tablet  Self Yes No   Sig: TAKE 1 TABLET (500  MG TOTAL) BY MOUTH 2 (TWO) TIMES A DAY WITH MEALS. TAKE WITH MEALS.   Patient not taking: Reported on 4/24/2023   omega-3-acid ethyl esters (LOVAZA) 1 g capsule  Self Yes No   Sig: TAKE 2 CAPSULES (2 G TOTAL) BY MOUTH 2 (TWO) TIMES A DAY.   pantoprazole (PROTONIX) 40 mg tablet  Self Yes No   Sig: Take 40 mg by mouth daily      Facility-Administered Medications: None     Discharge Medication List as of 6/15/2025  1:02 AM        CONTINUE these medications which have NOT CHANGED    Details   acetaminophen (TYLENOL) 500 mg tablet TAKE 1 TABLET (500 MG TOTAL) BY MOUTH EVERY 6 (SIX) HOURS AS NEEDED FOR MILD PAIN (PAIN SCORE 1-3)., Historical Med      !! albuterol (PROVENTIL HFA,VENTOLIN HFA) 90 mcg/act inhaler Inhale 2 puffs every 4 (four) hours as needed, Starting Fri 4/7/2023, Historical Med      !! albuterol (PROVENTIL HFA,VENTOLIN HFA) 90 mcg/act inhaler Inhale 2 puffs every 4 (four) hours as needed, Starting Fri 4/7/2023, Historical Med      aspirin 81 mg chewable tablet Chew 81 mg if needed for mild pain, Historical Med      cetirizine (ZyrTEC) 10 mg tablet Take 10 mg by mouth daily, Starting Fri 4/7/2023, Historical Med      cyclobenzaprine (FLEXERIL) 10 mg tablet Take 10 mg by mouth 2 (two) times a day as needed, Starting Fri 4/7/2023, Historical Med      fexofenadine (ALLEGRA) 180 MG tablet Take 180 mg by mouth daily, Starting Mon 3/20/2023, Historical Med      ibuprofen (MOTRIN) 600 mg tablet TAKE 1 TABLET (600 MG TOTAL) BY MOUTH EVERY 6 (SIX) HOURS AS NEEDED FOR MILD PAIN (PAIN SCORE 1-3)., Historical Med      lidocaine (LIDODERM) 5 % PLACE 1 PATCH ON THE SKIN DAILY FOR 7 DAYS. REMOVE & DISCARD PATCH WITHIN 12 HOURS OR AS DIRECTED BY MD, Historical Med      LORazepam (Ativan) 0.5 mg tablet Take 1 tablet by mouth 60 minutes prior to the scheduled procedure.  If still feeling anxious may repeat 1 tablet orally 30 minutes before the procedure.  Do not take more than 2 tablets.  Do not drive, drink alcohol or  operate heavy machinery after taki ng the medication., Normal      methocarbamol (ROBAXIN) 750 mg tablet TAKE 1 TABLET (750 MG TOTAL) BY MOUTH 3 (THREE) TIMES A DAY AS NEEDED FOR MUSCLE SPASMS., Historical Med      naproxen (NAPROSYN) 500 mg tablet TAKE 1 TABLET (500 MG TOTAL) BY MOUTH 2 (TWO) TIMES A DAY WITH MEALS. TAKE WITH MEALS., Historical Med      Nerve Stimulator (TENS Therapy Pain Relief) REID Use 1 application. 2 (two) times a day as needed (for lumbar radiculopathy), Starting Mon 4/24/2023, Print      omega-3-acid ethyl esters (LOVAZA) 1 g capsule TAKE 2 CAPSULES (2 G TOTAL) BY MOUTH 2 (TWO) TIMES A DAY., Historical Med      pantoprazole (PROTONIX) 40 mg tablet Take 40 mg by mouth daily, Starting Fri 4/7/2023, Historical Med       !! - Potential duplicate medications found. Please discuss with provider.        No discharge procedures on file.  ED SEPSIS DOCUMENTATION   Time reflects when diagnosis was documented in both MDM as applicable and the Disposition within this note       Time User Action Codes Description Comment    6/15/2025 12:59 AM Latoya Serra Add [S61.019A] Thumb laceration                      [1]   Past Medical History:  Diagnosis Date    Asthma    [2] No past surgical history on file.  [3] No family history on file.  [4]   Social History  Tobacco Use    Smoking status: Every Day     Types: Cigarettes    Smokeless tobacco: Never   Vaping Use    Vaping status: Never Used   Substance Use Topics    Alcohol use: Never    Drug use: Never        Latoya Serar MD  06/15/25 0108